# Patient Record
Sex: MALE | Race: WHITE | Employment: FULL TIME | URBAN - METROPOLITAN AREA
[De-identification: names, ages, dates, MRNs, and addresses within clinical notes are randomized per-mention and may not be internally consistent; named-entity substitution may affect disease eponyms.]

---

## 2019-11-18 RX ORDER — ATORVASTATIN CALCIUM 10 MG/1
10 TABLET, FILM COATED ORAL DAILY
COMMUNITY
End: 2021-08-07

## 2019-11-18 RX ORDER — GLUCOSAMINE SULFATE 1500 MG
1000 POWDER IN PACKET (EA) ORAL DAILY
COMMUNITY

## 2019-11-18 NOTE — PERIOP NOTES
[address] , [address] [facility]   MAIN OR                                  [phone]   MAIN PRE OP                          [phone]                                                                                AMBULATORY PRE OP          [phone]  PRE-ADMISSION TESTING    [phone]   Surgery Date:   Thursday 11/21/19        Is surgery arrival time given by surgeon? NO  If Danita Najera staff will call you Wednesday 11/20/19 between 3 and 7pm the day before your surgery with your arrival time. (If your surgery is on a Monday, we will call you the Friday before.)    Call [phone] after 7pm Monday-Friday if you did not receive this call. INSTRUCTIONS BEFORE YOUR SURGERY   When You  Arrive Arrive at the [address] [facility] on the day of your surgery  Have your insurance card, photo ID, and any copayment (if needed)   Food   and   Drink NO food or drink after midnight the night before surgery    This means NO water, gum, mints, coffee, juice, etc.  No alcohol (beer, wine, liquor) 24 hours before and after surgery   Medications to   TAKE   Morning of Surgery MEDICATIONS TO TAKE THE MORNING OF SURGERY WITH A SIP OF WATER:    none   Medications  To  STOP      7 days before surgery  Non-Steroidal anti-inflammatory Drugs (NSAID's): for example, Ibuprofen (Advil, Motrin), Naproxen (Aleve)   Aspirin, if taking for pain    Herbal supplements, vitamins, and fish oil     Blood  Thinners  If you take  Aspirin, Plavix, Coumadin, or any blood-thinning or anti-blood clot medicine, talk to the doctor who prescribed the medications for pre-operative instructions.    Bathing Clothing  Jewelry  Valuables      If you shower the morning of surgery, please do not apply anything to your skin (lotions, powders, deodorant)   Do not shave or trim anywhere 24 hours before surgery   Wear your hair loose or down; no pony-tails, buns, or metal hair clips   Wear loose, comfortable, clean clothes   Wear glasses instead of contacts  [facility], valuables, and jewelry, including body piercings, at home   Going Home - or Spending the Night  SAME-DAY SURGERY: You must have a responsible adult drive you home and stay with you 24 hours after surgery   ADMITS: If your doctor is keeping you in the hospital after surgery, leave personal belongings/luggage in your car until you have a hospital room number. Hospital discharge time is 12 noon  Drivers must be here before 12 noon unless you are told differently   Special Instructions Free  parking 7a-5p. Follow all instructions so your surgery wont be cancelled. Please, be on time. If a situation occurs and you are delayed the day of surgery, call [phone] or [phone]. If your physical condition changes (like a fever, cold, flu, etc.) call your surgeon. Home medication(s) reviewed and verified with patient during PAT appointment. The patient was contacted via phone. The patient verbalizes understanding of all instructions and does not  need reinforcement.

## 2019-11-20 ENCOUNTER — ANESTHESIA EVENT (OUTPATIENT)
Dept: SURGERY | Age: 49
End: 2019-11-20
Payer: COMMERCIAL

## 2019-11-21 ENCOUNTER — ANESTHESIA (OUTPATIENT)
Dept: SURGERY | Age: 49
End: 2019-11-21
Payer: COMMERCIAL

## 2019-11-21 ENCOUNTER — HOSPITAL ENCOUNTER (OUTPATIENT)
Age: 49
Setting detail: OUTPATIENT SURGERY
Discharge: HOME | End: 2019-11-21
Attending: PODIATRIST | Admitting: PODIATRIST
Payer: COMMERCIAL

## 2019-11-21 VITALS
DIASTOLIC BLOOD PRESSURE: 91 MMHG | BODY MASS INDEX: 32.4 KG/M2 | SYSTOLIC BLOOD PRESSURE: 129 MMHG | RESPIRATION RATE: 18 BRPM | WEIGHT: 260.58 LBS | OXYGEN SATURATION: 98 % | HEART RATE: 72 BPM | TEMPERATURE: 97.8 F | HEIGHT: 75 IN

## 2019-11-21 PROBLEM — M72.2 PLANTAR FASCIITIS OF LEFT FOOT: Status: ACTIVE | Noted: 2019-11-21

## 2019-11-21 PROCEDURE — 77030040361 HC SLV COMPR DVT MDII -B

## 2019-11-21 PROCEDURE — 76060000061 HC AMB SURG ANES 0.5 TO 1 HR: Performed by: PODIATRIST

## 2019-11-21 PROCEDURE — 77030002933 HC SUT MCRYL J&J -A: Performed by: PODIATRIST

## 2019-11-21 PROCEDURE — 74011250636 HC RX REV CODE- 250/636: Performed by: PODIATRIST

## 2019-11-21 PROCEDURE — 77030018836 HC SOL IRR NACL ICUM -A: Performed by: PODIATRIST

## 2019-11-21 PROCEDURE — 74011250636 HC RX REV CODE- 250/636: Performed by: ANESTHESIOLOGY

## 2019-11-21 PROCEDURE — 77030011640 HC PAD GRND REM COVD -A: Performed by: PODIATRIST

## 2019-11-21 PROCEDURE — 74011000250 HC RX REV CODE- 250: Performed by: PODIATRIST

## 2019-11-21 PROCEDURE — 76030000000 HC AMB SURG OR TIME 0.5 TO 1: Performed by: PODIATRIST

## 2019-11-21 PROCEDURE — 76210000046 HC AMBSU PH II REC FIRST 0.5 HR: Performed by: PODIATRIST

## 2019-11-21 PROCEDURE — 76210000034 HC AMBSU PH I REC 0.5 TO 1 HR: Performed by: PODIATRIST

## 2019-11-21 PROCEDURE — 77030037677: Performed by: PODIATRIST

## 2019-11-21 PROCEDURE — 77030000032 HC CUF TRNQT ZIMM -B: Performed by: PODIATRIST

## 2019-11-21 RX ORDER — ONDANSETRON 2 MG/ML
4 INJECTION INTRAMUSCULAR; INTRAVENOUS AS NEEDED
Status: DISCONTINUED | OUTPATIENT
Start: 2019-11-21 | End: 2019-11-21 | Stop reason: HOSPADM

## 2019-11-21 RX ORDER — SODIUM CHLORIDE, SODIUM LACTATE, POTASSIUM CHLORIDE, CALCIUM CHLORIDE 600; 310; 30; 20 MG/100ML; MG/100ML; MG/100ML; MG/100ML
100 INJECTION, SOLUTION INTRAVENOUS CONTINUOUS
Status: DISCONTINUED | OUTPATIENT
Start: 2019-11-21 | End: 2019-11-21 | Stop reason: HOSPADM

## 2019-11-21 RX ORDER — SODIUM CHLORIDE 0.9 % (FLUSH) 0.9 %
5-40 SYRINGE (ML) INJECTION AS NEEDED
Status: DISCONTINUED | OUTPATIENT
Start: 2019-11-21 | End: 2019-11-21 | Stop reason: HOSPADM

## 2019-11-21 RX ORDER — SODIUM CHLORIDE, SODIUM LACTATE, POTASSIUM CHLORIDE, CALCIUM CHLORIDE 600; 310; 30; 20 MG/100ML; MG/100ML; MG/100ML; MG/100ML
125 INJECTION, SOLUTION INTRAVENOUS CONTINUOUS
Status: DISCONTINUED | OUTPATIENT
Start: 2019-11-21 | End: 2019-11-21 | Stop reason: HOSPADM

## 2019-11-21 RX ORDER — SODIUM CHLORIDE, SODIUM LACTATE, POTASSIUM CHLORIDE, CALCIUM CHLORIDE 600; 310; 30; 20 MG/100ML; MG/100ML; MG/100ML; MG/100ML
75 INJECTION, SOLUTION INTRAVENOUS CONTINUOUS
Status: DISCONTINUED | OUTPATIENT
Start: 2019-11-21 | End: 2019-11-21 | Stop reason: HOSPADM

## 2019-11-21 RX ORDER — LIDOCAINE HYDROCHLORIDE 10 MG/ML
INJECTION INFILTRATION; PERINEURAL AS NEEDED
Status: DISCONTINUED | OUTPATIENT
Start: 2019-11-21 | End: 2019-11-21 | Stop reason: HOSPADM

## 2019-11-21 RX ORDER — FENTANYL CITRATE 50 UG/ML
INJECTION, SOLUTION INTRAMUSCULAR; INTRAVENOUS AS NEEDED
Status: DISCONTINUED | OUTPATIENT
Start: 2019-11-21 | End: 2019-11-21 | Stop reason: HOSPADM

## 2019-11-21 RX ORDER — MIDAZOLAM HYDROCHLORIDE 1 MG/ML
INJECTION, SOLUTION INTRAMUSCULAR; INTRAVENOUS AS NEEDED
Status: DISCONTINUED | OUTPATIENT
Start: 2019-11-21 | End: 2019-11-21 | Stop reason: HOSPADM

## 2019-11-21 RX ORDER — LIDOCAINE HYDROCHLORIDE 10 MG/ML
0.1 INJECTION, SOLUTION EPIDURAL; INFILTRATION; INTRACAUDAL; PERINEURAL AS NEEDED
Status: DISCONTINUED | OUTPATIENT
Start: 2019-11-21 | End: 2019-11-21 | Stop reason: HOSPADM

## 2019-11-21 RX ORDER — CEFAZOLIN SODIUM/WATER 2 G/20 ML
2 SYRINGE (ML) INTRAVENOUS ONCE
Status: DISCONTINUED | OUTPATIENT
Start: 2019-11-21 | End: 2019-11-21 | Stop reason: SDUPTHER

## 2019-11-21 RX ORDER — LIDOCAINE HYDROCHLORIDE 10 MG/ML
0.1 INJECTION, SOLUTION EPIDURAL; INFILTRATION; INTRACAUDAL; PERINEURAL AS NEEDED
Status: DISCONTINUED | OUTPATIENT
Start: 2019-11-21 | End: 2019-11-21 | Stop reason: SDUPTHER

## 2019-11-21 RX ORDER — SODIUM CHLORIDE 0.9 % (FLUSH) 0.9 %
5-40 SYRINGE (ML) INJECTION EVERY 8 HOURS
Status: DISCONTINUED | OUTPATIENT
Start: 2019-11-21 | End: 2019-11-21 | Stop reason: HOSPADM

## 2019-11-21 RX ORDER — DIPHENHYDRAMINE HYDROCHLORIDE 50 MG/ML
12.5 INJECTION, SOLUTION INTRAMUSCULAR; INTRAVENOUS AS NEEDED
Status: DISCONTINUED | OUTPATIENT
Start: 2019-11-21 | End: 2019-11-21 | Stop reason: HOSPADM

## 2019-11-21 RX ORDER — HYDROMORPHONE HYDROCHLORIDE 1 MG/ML
.25-1 INJECTION, SOLUTION INTRAMUSCULAR; INTRAVENOUS; SUBCUTANEOUS
Status: DISCONTINUED | OUTPATIENT
Start: 2019-11-21 | End: 2019-11-21 | Stop reason: HOSPADM

## 2019-11-21 RX ORDER — BUPIVACAINE HYDROCHLORIDE 5 MG/ML
INJECTION, SOLUTION EPIDURAL; INTRACAUDAL AS NEEDED
Status: DISCONTINUED | OUTPATIENT
Start: 2019-11-21 | End: 2019-11-21 | Stop reason: HOSPADM

## 2019-11-21 RX ORDER — PROPOFOL 10 MG/ML
INJECTION, EMULSION INTRAVENOUS
Status: DISCONTINUED | OUTPATIENT
Start: 2019-11-21 | End: 2019-11-21 | Stop reason: HOSPADM

## 2019-11-21 RX ADMIN — FENTANYL CITRATE 100 MCG: 50 INJECTION INTRAMUSCULAR; INTRAVENOUS at 09:45

## 2019-11-21 RX ADMIN — SODIUM CHLORIDE, POTASSIUM CHLORIDE, SODIUM LACTATE AND CALCIUM CHLORIDE: 600; 310; 30; 20 INJECTION, SOLUTION INTRAVENOUS at 09:36

## 2019-11-21 RX ADMIN — MIDAZOLAM 2 MG: 1 INJECTION INTRAMUSCULAR; INTRAVENOUS at 09:48

## 2019-11-21 RX ADMIN — MIDAZOLAM 2 MG: 1 INJECTION INTRAMUSCULAR; INTRAVENOUS at 09:32

## 2019-11-21 RX ADMIN — CEFAZOLIN 2 G: 1 INJECTION, POWDER, FOR SOLUTION INTRAMUSCULAR; INTRAVENOUS at 09:36

## 2019-11-21 RX ADMIN — PROPOFOL 50 MCG/KG/MIN: 10 INJECTION, EMULSION INTRAVENOUS at 09:40

## 2019-11-21 NOTE — ANESTHESIA PREPROCEDURE EVALUATION
Relevant Problems   No relevant active problems       Anesthetic History   No history of anesthetic complications            Review of Systems / Medical History  Patient summary reviewed and pertinent labs reviewed    Pulmonary  Within defined limits                 Neuro/Psych   Within defined limits           Cardiovascular              Hyperlipidemia         GI/Hepatic/Renal  Within defined limits              Endo/Other  Within defined limits           Other Findings              Physical Exam    Airway  Mallampati: II  TM Distance: 4 - 6 cm  Neck ROM: normal range of motion   Mouth opening: Normal     Cardiovascular  Regular rate and rhythm,  S1 and S2 normal,  no murmur, click, rub, or gallop  Rhythm: regular  Rate: normal         Dental  No notable dental hx       Pulmonary  Breath sounds clear to auscultation               Abdominal  GI exam deferred       Other Findings            Anesthetic Plan    ASA: 1  Anesthesia type: MAC          Induction: Intravenous  Anesthetic plan and risks discussed with: Patient

## 2019-11-21 NOTE — ANESTHESIA POSTPROCEDURE EVALUATION
Procedure(s):  PARTIAL RELEASE PLANTAR FASCIA LEFT FOOT. MAC    Anesthesia Post Evaluation        Patient location during evaluation: bedside  Patient participation: complete - patient cannot participate  Level of consciousness: awake  Pain management: satisfactory to patient  Airway patency: patent  Anesthetic complications: no  Cardiovascular status: acceptable  Respiratory status: acceptable  Hydration status: acceptable        Vitals Value Taken Time   /87 11/21/2019 10:27 AM   Temp 36.4 °C (97.5 °F) 11/21/2019 10:27 AM   Pulse 90 11/21/2019 10:29 AM   Resp 16 11/21/2019 10:29 AM   SpO2 92 % 11/21/2019 10:29 AM   Vitals shown include unvalidated device data.

## 2019-11-21 NOTE — BRIEF OP NOTE
BRIEF OPERATIVE NOTE    Date of Procedure: 11/21/2019   Preoperative Diagnosis: PLANTAR FASCIITIS  Postoperative Diagnosis: PLANTAR FASCIITIS  Procedure(s):  PARTIAL RELEASE PLANTAR FASCIA LEFT FOOT  Surgeon(s) and Role:     * Yolanda Gomez DPM - Primary         Surgical Assistant: none. Surgical Staff:  Circ-1: Jois Mccormick RN  Scrub Tech-1: Jaison LIZAMA  Surg Asst-1: Peri Mayo  Event Time In Time Out   Incision Start 0957    Incision Close 1024      Anesthesia: MAC   Estimated Blood Loss: Less than 5 mL. Specimens: * No specimens in log *   Findings: Hypertrophic scar tissue   Complications: None. Implants:   Implant Name Type Inv.  Item Serial No.  Lot No. LRB No. Used Action   [employer] ALLOGEN FZ LIQUID 1 ML   [id number] [employer] N/A Left 1 Implanted

## 2019-11-21 NOTE — DISCHARGE INSTRUCTIONS
Post-Operative Instructions:    Patient Name: Kimmy Rooney  Patient MRN: [id number]  : 1970  Discharged Date: 2019      MEDICATIONS:   -If you experience nausea, take anti-nausea medication   -Take pain medication regularly for first 48 hours. Then take as needed for pain.     -Often anti-nausea medication helps relieve pain due to it's mild sedative effect.   -Constipation can be a common side effect when taking narcotic pain medications, take precautions to avoid this:    -Drink plenty of fluids    -Eat plenty of fiber    -Avoid caffeine and dairy products    -Take stool softener if needed (Colace/Dulcolax)    DIET:   -Eat light foods for first meal today (ie: dry cereal/toast/crackers, clear fluids). -If tolerated first meal, then can eat normally at second meal.    ACTIVITY:   -Relative BED REST for first 72 hours (only getting to bathroom, bedroom, kitchen)   -Keep surgical shoe/boot on at all times (even when sleeping)   -Elevate surgical site at all times (at least 6 inches above hip level)   -Apply ice pack to just above surgical site (NOT directly on the site), 10 mins on and 20 mins off for the first 72 hours.   -Weight-bearing: NWB with crutches and surgical shoe, left foot. DRESSINGS/SHOWER:   -Keep dressing clean, dry, and intact at all times.   -Reinforce dressing as needed, but DO NOT remove dressing!! EMERGENCY:   -Call office ([phone]) or on all pager after hours ([phone]) if. ..    -bandages become wet or heavy drainage/bleeding noted    -medications are not helping your pain    -you injure or bump the surgical site    -you have fever over 101.5 degrees    FOLLOW-UP:   -Make sure you follow-up with your doctor within 1 week of surgery.    -Call office ([phone]) if you need to schedule appointment     ACTIVITY:  · Elevate feet were 48 hours  · Use crutches as directed by your doctor    DIET:  · Clear liquids until no nausea or vomiting; then light diet for the first day  · An advance to regular diet On second day, unless your doctor orders otherwise. PAIN:  · Take pain medication ouster acted by your doctor. · Call your doctor if pain is not relieved by medication. · Do not take aspirin or blood thinners until directed by your doctor. Patients signature:  Date: 11/21/2019  Discharging Nurse:    Physician: Fuentes Lemus, DPM        DISCHARGE SUMMARY from your Nurse      PATIENT INSTRUCTIONS    After general anesthesia or intravenous sedation, for 24 hours or while taking prescription Narcotics:  · Limit your activities  · Do not drive and operate hazardous machinery  · Do not make important personal or business decisions  · Do  not drink alcoholic beverages  · If you have not urinated within 8 hours after discharge, please contact your surgeon on call. Report the following to your surgeon:  · Excessive pain, swelling, redness or odor of or around the surgical area  · Temperature over 100.5  · Nausea and vomiting lasting longer than 4 hours or if unable to take medications  · Any signs of decreased circulation or nerve impairment to extremity: change in color, persistent  numbness, tingling, coldness or increase pain  · Any questions      COUGH AND DEEP BREATHE    Breathing deeply and coughing are very important exercises to do after surgery. Deep breathing and coughing open the little air tubes and air sacks in your lungs. You take deep breaths every day. You may not even notice - it is just something you do when you sigh or yawn. It is a natural exercise you do to keep these air passages open. After surgery, take deep breaths and cough, on purpose. DIRECTIONS:  · Take 10 to 15 slow deep breaths every hour while awake. · Breathe in deeply, and hold it for 2 seconds. · Exhale slowly through puckered lips, like blowing up a balloon. · After every 4th or 5th deep breath, hug your pillow to your chest or belly and give a hard, deep cough. Yes, it will probably hurt. But doing this exercise is a very important part of healing after surgery. Take your pain medicine to help you do this exercise without too much pain. Coughing and deep breathing help prevent bronchitis and pneumonia after surgery. If you had chest or belly surgery, use a pillow as a \"hug jorge\" and hold it tightly to your chest or belly when you cough. ANKLE PUMPS    Ankle pumps increase the circulation of oxygenated blood to your lower extremities and decrease your risk for circulation problems such as blood clots. They also stretch the muscles, tendons and ligaments in your foot and ankle, and prevent joint contracture in the ankle and foot, especially after surgeries on the legs. It is important to do ankle pump exercises regularly after surgery because immobility increases your risk for developing a blood clot. Your doctor may also have you take an Aspirin for the next few days as well. If your doctor did not ask you to take an Aspirin, consult with him before starting Aspirin therapy on your own. The exercise is quite simple. · Slowly point your foot forward, feeling the muscles on the top of your lower leg stretch, and hold this position for 5 seconds. · Next, pull your foot back toward you as far as possible, stretching the calf muscles, and hold that position for 5 seconds. · Repeat with the other foot. · Perform 10 repetitions every hour while awake for both ankles if possible (down and then up with the foot once is one repetition). You should feel gentle stretching of the muscles in your lower leg when doing this exercise. If you feel pain, or your range of motion is limited, don't push too hard. Only go the limit your joint and muscles will let you go. If you have increasing pain, progressively worsening leg warmth or swelling, STOP the exercise and call your doctor.            MEDICATION AND   SIDE EFFECT GUIDE    The [address] EFFECT GUIDE was provided to the PATIENT AND CARE PROVIDER. Information provided includes instruction about drug purpose and common side effects for the following medications:   · [address]   · PHENERGAN         These are general instructions for a healthy lifestyle:    *   Please give a list of your current medications to your Primary Care Provider. *   Please update this list whenever your medications are discontinued, doses are changed, or new medications (including over-the-counter products) are added. *   Please carry medication information at all times in case of emergency situations. About Smoking  No smoking / No tobacco products  Avoid exposure to second hand smoke     Surgeon General's Warning:  Quitting smoking now greatly reduces serious risk to your health. Obesity, smoking, and sedentary lifestyle greatly increases your risk for illness and disease. A healthy diet, regular physical exercise & weight monitoring are important for maintaining a healthy lifestyle. Congestive Heart Failure  You may be retaining fluid if you have a history of heart failure or if you experience any of the following symptoms:  Weight gain of 3 pounds or more overnight or 5 pounds in a week, increased swelling in your hands or feet or shortness of breath while lying flat in bed. Please call your doctor as soon as you notice any of these symptoms; do not wait until your next office visit. Recognize signs and symptoms of STROKE:  F -  Face looks uneven  A -  Arms unable to move or move evenly  S -  Speech slurred or non-existent  T -  Time-call 911 as soon as signs and symptoms begin-DO NOT go          back to bed or wait to see if you get better-TIME IS BRAIN. Warning Signs of HEART ATTACK   Call 911 if you have these symptoms:     Chest discomfort.  Most heart attacks involve discomfort in the center of the chest that lasts more than a few minutes, or that goes away and comes back. It can feel like uncomfortable pressure, squeezing, fullness, or pain.  Discomfort in other areas of the upper body. Symptoms can include pain or discomfort in one or both arms, the back, neck, jaw, or stomach.  Shortness of breath with or without chest discomfort.  Other signs may include breaking out in a cold sweat, nausea, or lightheadedness. Don't wait more than five minutes to call 911 - MINUTES MATTER! Fast action can save your life. Calling 911 is almost always the fastest way to get lifesaving treatment. Emergency Medical Services staff can begin treatment when they arrive -- up to an hour sooner than if someone gets to the hospital by car. The discharge information has been reviewed with the {PATIENT PARENT GUARDIAN:[id number]}. Any questions and concerns from the {PATIENT PARENT GUARDIAN:[id number]} have been addressed. The {PATIENT PARENT GUARDIAN:01683} verbalized understanding. Other information in your discharge envelope:  []     PRESCRIPTIONS  []     PHYSICAL THERAPY PRESCRIPTION  []     APPOINTMENT CARDS  []     Regional Anesthesia Pamphlet for block or block with On-Q Catheter from   Anesthesia Service  []     Medical device information sheets/pamphlets from their    []     School/work excuse note. []     /parent work excuse note. The following personal items collected during your admission are returned to you:   Dental Appliance: Dental Appliances: With patient, Other (comment)(permanent dental appliance in the mouth)  Vision: Visual Aid: None  Hearing Aid:    Jewelry: Jewelry: None  Clothing: Clothing: Footwear, Pants, Undergarments  Other Valuables:  Other Valuables: None  Valuables sent to safe: Personal Items Sent to Safe: none

## 2019-11-22 NOTE — OP NOTES
[facility]  OPERATIVE REPORT    Name:  Julianne Dey  MR#:  [id number]  :  1970  ACCOUNT #:  [de-identified]  DATE OF SERVICE:  2019    PREOPERATIVE DIAGNOSIS:  Plantar fasciitis of the left foot. POSTOPERATIVE DIAGNOSIS:  Plantar fasciitis of the left foot. PROCEDURE PERFORMED:  Partial release of the plantar fascia of the left foot. SURGEON:  Abbi aSl DPM    ASSISTANT:  None. ANESTHESIA:  Local with IV sedation. ANESTHESIOLOGIST:  Dr. Madisyn Garcia. COMPLICATIONS:  None. SPECIMENS REMOVED:  None. IMPLANTS:  1 cc of Amniotic derived injection by Vivex. ESTIMATED BLOOD LOSS:  Less than 5 mL. FINDINGS:  Hypertrophic scar tissue on the plantar fascia. INDICATIONS:  The patient is a 56-year-old white male, who presented to the office with chronically painful plantar fasciitis. The patient had undergone an extensive course of nonsurgical care in the form of multiple cortisone injections, home exercise plan, outpatient physical therapy, prefabricated and then custom foot orthotics, all with no symptomatic relief. The patient declined further nonsurgical care. The patient had all treatment options reviewed with him however from surgical to nonsurgical.  The patient was made aware of all risks, benefits, alternatives, and potential complications of surgical management including but not limited to need for additional surgery, failure of the procedure to achieve desired results, swelling, stiffness, scarring, numbness, nerve damage, infection of the soft tissue and/or bone, the potential of the surgery making his condition worse and not better. The patient had the consent reviewed, understood, and signed and elected for surgical management of his condition. PREPARATION AND PROCEDURE:  The patient was taken to the operating room and placed on the operating room table in supine position.   The patient had a well-padded pneumatic midcalf tourniquet applied to the left calf. The patient had received 2 g of Ancef for preoperative prophylactic antibiotics and had the left foot and ankle prepped and draped in normal sterile fashion. The pneumatic midcalf tourniquet was elevated to 300 mmHg and the procedure began. PROCEDURE #1:  Partial release of the plantar fascia of the left foot. Attention was directed towards the plantar medial arch of the left foot, where a small stab incision was made. Then, with the use of an 18-gauge needle, the needle was utilized to release the plantar medial and central bands of the plantar fascia. The site was copiously flushed and irrigated. Then, attention was directed along the entire course of the plantar central and plantar medial bands of the plantar fascia, where a palpable scar tissue could be identified. The, with the use of a 25-gauge needle, these hypertrophic scarred-in sites were fenestrated in a grid fashion. Then with the use of 20-mL syringe, 20 mL of .09% sterile saline was then were injected into the chronically scarred-in plantar medial and plantar central bands of the plantar fascia of the left foot. Next, the Vivex 1 mL amniotic-derived injectable was infiltrated throughout the plantar fascia to decrease postoperative swelling, scarring, and increase healing potential.  The site again was copiously flushed and irrigated. Preoperatively, it should be noted the patient received a tibial nerve block of 10 mL of 1% lidocaine and then 10 mL of 1% lidocaine was infiltrated into the surgical site more distally. The same injection was performed postoperatively utilizing a total of 20 mL of 0.5% Marcaine plain. The surgical site was dressed with Xeroform, 4x4, Porsha, and an Ace bandage. The pneumatic midcalf tourniquet had been deflated and note that the capillary filling time was immediate to all the digits of the left foot. The patient will be followed up as an outpatient.         [address] [place] [address], DPM      [email or web address]/[id number]/  D:  11/21/2019 13:10  T:  11/21/2019 22:42  JOB #:  [id number]

## 2021-08-07 ENCOUNTER — HOSPITAL ENCOUNTER (INPATIENT)
Age: 51
LOS: 2 days | Discharge: HOME | DRG: 177 | End: 2021-08-09
Attending: EMERGENCY MEDICINE | Admitting: INTERNAL MEDICINE
Payer: COMMERCIAL

## 2021-08-07 ENCOUNTER — APPOINTMENT (OUTPATIENT)
Dept: GENERAL RADIOLOGY | Age: 51
DRG: 177 | End: 2021-08-07
Attending: EMERGENCY MEDICINE
Payer: COMMERCIAL

## 2021-08-07 ENCOUNTER — APPOINTMENT (OUTPATIENT)
Dept: CT IMAGING | Age: 51
DRG: 177 | End: 2021-08-07
Attending: EMERGENCY MEDICINE
Payer: COMMERCIAL

## 2021-08-07 DIAGNOSIS — R09.02 HYPOXIA: ICD-10-CM

## 2021-08-07 DIAGNOSIS — A41.9 SEPSIS WITHOUT ACUTE ORGAN DYSFUNCTION, DUE TO UNSPECIFIED ORGANISM (HCC): ICD-10-CM

## 2021-08-07 DIAGNOSIS — U07.1 PNEUMONIA DUE TO COVID-19 VIRUS: Primary | ICD-10-CM

## 2021-08-07 DIAGNOSIS — J12.82 PNEUMONIA DUE TO COVID-19 VIRUS: Primary | ICD-10-CM

## 2021-08-07 PROBLEM — R79.89 ELEVATED LACTIC ACID LEVEL: Status: ACTIVE | Noted: 2021-08-07

## 2021-08-07 PROBLEM — R82.81 PYURIA: Status: ACTIVE | Noted: 2021-08-07

## 2021-08-07 PROBLEM — E78.5 HLD (HYPERLIPIDEMIA): Status: ACTIVE | Noted: 2021-08-07

## 2021-08-07 PROBLEM — R73.09 ELEVATED GLUCOSE: Status: ACTIVE | Noted: 2021-08-07

## 2021-08-07 PROBLEM — R65.20 SEPSIS WITH ACUTE ORGAN DYSFUNCTION (HCC): Status: ACTIVE | Noted: 2021-08-07

## 2021-08-07 PROBLEM — J96.01 ACUTE RESPIRATORY FAILURE WITH HYPOXIA (HCC): Status: ACTIVE | Noted: 2021-08-07

## 2021-08-07 LAB
ALBUMIN SERPL-MCNC: 3.4 G/DL (ref 3.5–5)
ALBUMIN/GLOB SERPL: 0.8 {RATIO} (ref 1.1–2.2)
ALP SERPL-CCNC: 65 U/L (ref 45–117)
ALT SERPL-CCNC: 28 U/L (ref 12–78)
ANION GAP SERPL CALC-SCNC: 7 MMOL/L (ref 5–15)
APPEARANCE UR: CLEAR
AST SERPL-CCNC: 26 U/L (ref 15–37)
BACTERIA URNS QL MICRO: ABNORMAL /HPF
BASOPHILS # BLD: 0 K/UL (ref 0–0.1)
BASOPHILS NFR BLD: 0 % (ref 0–1)
BILIRUB SERPL-MCNC: 0.7 MG/DL (ref 0.2–1)
BILIRUB UR QL: NEGATIVE
BUN SERPL-MCNC: 13 MG/DL (ref 6–20)
BUN/CREAT SERPL: 13 (ref 12–20)
CALCIUM SERPL-MCNC: 8.3 MG/DL (ref 8.5–10.1)
CHLORIDE SERPL-SCNC: 101 MMOL/L (ref 97–108)
CO2 SERPL-SCNC: 26 MMOL/L (ref 21–32)
COLOR UR: ABNORMAL
COMMENT, HOLDF: NORMAL
COVID-19 RAPID TEST, COVR: DETECTED
CREAT SERPL-MCNC: 1.04 MG/DL (ref 0.7–1.3)
D DIMER PPP FEU-MCNC: 1.21 MG/L FEU (ref 0–0.65)
DIFFERENTIAL METHOD BLD: ABNORMAL
EOSINOPHIL # BLD: 0 K/UL (ref 0–0.4)
EOSINOPHIL NFR BLD: 0 % (ref 0–7)
EPITH CASTS URNS QL MICRO: ABNORMAL /LPF
ERYTHROCYTE [DISTWIDTH] IN BLOOD BY AUTOMATED COUNT: 11.2 % (ref 11.5–14.5)
GLOBULIN SER CALC-MCNC: 4.3 G/DL (ref 2–4)
GLUCOSE BLD STRIP.AUTO-MCNC: 195 MG/DL (ref 65–117)
GLUCOSE BLD STRIP.AUTO-MCNC: 234 MG/DL (ref 65–117)
GLUCOSE SERPL-MCNC: 215 MG/DL (ref 65–100)
GLUCOSE UR STRIP.AUTO-MCNC: 250 MG/DL
HCT VFR BLD AUTO: 43.3 % (ref 36.6–50.3)
HGB BLD-MCNC: 15 G/DL (ref 12.1–17)
HGB UR QL STRIP: NEGATIVE
IMM GRANULOCYTES # BLD AUTO: 0.1 K/UL (ref 0–0.04)
IMM GRANULOCYTES NFR BLD AUTO: 1 % (ref 0–0.5)
KETONES UR QL STRIP.AUTO: ABNORMAL MG/DL
LACTATE SERPL-SCNC: 1.8 MMOL/L (ref 0.4–2)
LACTATE SERPL-SCNC: 2.2 MMOL/L (ref 0.4–2)
LEUKOCYTE ESTERASE UR QL STRIP.AUTO: NEGATIVE
LYMPHOCYTES # BLD: 0.6 K/UL (ref 0.8–3.5)
LYMPHOCYTES NFR BLD: 5 % (ref 12–49)
MCH RBC QN AUTO: 31.4 PG (ref 26–34)
MCHC RBC AUTO-ENTMCNC: 34.6 G/DL (ref 30–36.5)
MCV RBC AUTO: 90.6 FL (ref 80–99)
MONOCYTES # BLD: 0.2 K/UL (ref 0–1)
MONOCYTES NFR BLD: 2 % (ref 5–13)
NEUTS SEG # BLD: 10.8 K/UL (ref 1.8–8)
NEUTS SEG NFR BLD: 92 % (ref 32–75)
NITRITE UR QL STRIP.AUTO: NEGATIVE
NRBC # BLD: 0 K/UL (ref 0–0.01)
NRBC BLD-RTO: 0 PER 100 WBC
PH UR STRIP: 6.5 [PH] (ref 5–8)
PLATELET # BLD AUTO: 150 K/UL (ref 150–400)
PMV BLD AUTO: 8.8 FL (ref 8.9–12.9)
POTASSIUM SERPL-SCNC: 3.3 MMOL/L (ref 3.5–5.1)
PROCALCITONIN SERPL-MCNC: 0.16 NG/ML
PROT SERPL-MCNC: 7.7 G/DL (ref 6.4–8.2)
PROT UR STRIP-MCNC: 100 MG/DL
RBC # BLD AUTO: 4.78 M/UL (ref 4.1–5.7)
RBC #/AREA URNS HPF: ABNORMAL /HPF (ref 0–5)
RBC MORPH BLD: ABNORMAL
SAMPLES BEING HELD,HOLD: NORMAL
SARS-COV-2, COV2: NORMAL
SERVICE CMNT-IMP: ABNORMAL
SERVICE CMNT-IMP: ABNORMAL
SODIUM SERPL-SCNC: 134 MMOL/L (ref 136–145)
SOURCE, COVRS: ABNORMAL
SP GR UR REFRACTOMETRY: 1.02 (ref 1–1.03)
TROPONIN I SERPL-MCNC: <0.05 NG/ML
UA: UC IF INDICATED,UAUC: ABNORMAL
UROBILINOGEN UR QL STRIP.AUTO: 4 EU/DL (ref 0.2–1)
WBC # BLD AUTO: 11.7 K/UL (ref 4.1–11.1)
WBC URNS QL MICRO: ABNORMAL /HPF (ref 0–4)

## 2021-08-07 PROCEDURE — 71045 X-RAY EXAM CHEST 1 VIEW: CPT

## 2021-08-07 PROCEDURE — 74011636637 HC RX REV CODE- 636/637: Performed by: INTERNAL MEDICINE

## 2021-08-07 PROCEDURE — 87635 SARS-COV-2 COVID-19 AMP PRB: CPT

## 2021-08-07 PROCEDURE — 87040 BLOOD CULTURE FOR BACTERIA: CPT

## 2021-08-07 PROCEDURE — 74011000258 HC RX REV CODE- 258: Performed by: INTERNAL MEDICINE

## 2021-08-07 PROCEDURE — 74011250637 HC RX REV CODE- 250/637: Performed by: INTERNAL MEDICINE

## 2021-08-07 PROCEDURE — 99285 EMERGENCY DEPT VISIT HI MDM: CPT

## 2021-08-07 PROCEDURE — 74011000250 HC RX REV CODE- 250: Performed by: INTERNAL MEDICINE

## 2021-08-07 PROCEDURE — 87899 AGENT NOS ASSAY W/OPTIC: CPT

## 2021-08-07 PROCEDURE — 83605 ASSAY OF LACTIC ACID: CPT

## 2021-08-07 PROCEDURE — 87449 NOS EACH ORGANISM AG IA: CPT

## 2021-08-07 PROCEDURE — 94640 AIRWAY INHALATION TREATMENT: CPT

## 2021-08-07 PROCEDURE — 74011000636 HC RX REV CODE- 636: Performed by: STUDENT IN AN ORGANIZED HEALTH CARE EDUCATION/TRAINING PROGRAM

## 2021-08-07 PROCEDURE — 65660000000 HC RM CCU STEPDOWN

## 2021-08-07 PROCEDURE — 71275 CT ANGIOGRAPHY CHEST: CPT

## 2021-08-07 PROCEDURE — 36415 COLL VENOUS BLD VENIPUNCTURE: CPT

## 2021-08-07 PROCEDURE — 87086 URINE CULTURE/COLONY COUNT: CPT

## 2021-08-07 PROCEDURE — 96374 THER/PROPH/DIAG INJ IV PUSH: CPT

## 2021-08-07 PROCEDURE — 85379 FIBRIN DEGRADATION QUANT: CPT

## 2021-08-07 PROCEDURE — 81001 URINALYSIS AUTO W/SCOPE: CPT

## 2021-08-07 PROCEDURE — 93005 ELECTROCARDIOGRAM TRACING: CPT

## 2021-08-07 PROCEDURE — 80053 COMPREHEN METABOLIC PANEL: CPT

## 2021-08-07 PROCEDURE — 74011250637 HC RX REV CODE- 250/637: Performed by: EMERGENCY MEDICINE

## 2021-08-07 PROCEDURE — 84145 PROCALCITONIN (PCT): CPT

## 2021-08-07 PROCEDURE — 74011250636 HC RX REV CODE- 250/636: Performed by: INTERNAL MEDICINE

## 2021-08-07 PROCEDURE — 74011250636 HC RX REV CODE- 250/636: Performed by: EMERGENCY MEDICINE

## 2021-08-07 PROCEDURE — 84484 ASSAY OF TROPONIN QUANT: CPT

## 2021-08-07 PROCEDURE — 85025 COMPLETE CBC W/AUTO DIFF WBC: CPT

## 2021-08-07 PROCEDURE — XW033E5 INTRODUCTION OF REMDESIVIR ANTI-INFECTIVE INTO PERIPHERAL VEIN, PERCUTANEOUS APPROACH, NEW TECHNOLOGY GROUP 5: ICD-10-PCS | Performed by: INTERNAL MEDICINE

## 2021-08-07 PROCEDURE — 82962 GLUCOSE BLOOD TEST: CPT

## 2021-08-07 RX ORDER — SODIUM CHLORIDE 0.9 % (FLUSH) 0.9 %
5-40 SYRINGE (ML) INJECTION AS NEEDED
Status: DISCONTINUED | OUTPATIENT
Start: 2021-08-07 | End: 2021-08-09 | Stop reason: HOSPADM

## 2021-08-07 RX ORDER — PSEUDOEPHEDRINE HCL 30 MG
30 TABLET ORAL
COMMUNITY

## 2021-08-07 RX ORDER — SODIUM CHLORIDE 0.9 % (FLUSH) 0.9 %
5-10 SYRINGE (ML) INJECTION AS NEEDED
Status: DISCONTINUED | OUTPATIENT
Start: 2021-08-07 | End: 2021-08-09 | Stop reason: HOSPADM

## 2021-08-07 RX ORDER — ZINC GLUCONATE 50 MG
50 TABLET ORAL DAILY
Status: DISCONTINUED | OUTPATIENT
Start: 2021-08-08 | End: 2021-08-09 | Stop reason: HOSPADM

## 2021-08-07 RX ORDER — MELATONIN
2000 DAILY
Status: DISCONTINUED | OUTPATIENT
Start: 2021-08-08 | End: 2021-08-09 | Stop reason: HOSPADM

## 2021-08-07 RX ORDER — POTASSIUM CHLORIDE 750 MG/1
40 TABLET, FILM COATED, EXTENDED RELEASE ORAL
Status: COMPLETED | OUTPATIENT
Start: 2021-08-07 | End: 2021-08-07

## 2021-08-07 RX ORDER — DEXAMETHASONE SODIUM PHOSPHATE 10 MG/ML
6 INJECTION INTRAMUSCULAR; INTRAVENOUS DAILY
Status: DISCONTINUED | OUTPATIENT
Start: 2021-08-07 | End: 2021-08-07

## 2021-08-07 RX ORDER — ONDANSETRON 4 MG/1
4 TABLET, ORALLY DISINTEGRATING ORAL
Status: DISCONTINUED | OUTPATIENT
Start: 2021-08-07 | End: 2021-08-09 | Stop reason: HOSPADM

## 2021-08-07 RX ORDER — ENOXAPARIN SODIUM 100 MG/ML
30 INJECTION SUBCUTANEOUS EVERY 12 HOURS
Status: DISCONTINUED | OUTPATIENT
Start: 2021-08-07 | End: 2021-08-07 | Stop reason: DRUGHIGH

## 2021-08-07 RX ORDER — MAGNESIUM SULFATE 100 %
4 CRYSTALS MISCELLANEOUS AS NEEDED
Status: DISCONTINUED | OUTPATIENT
Start: 2021-08-07 | End: 2021-08-09 | Stop reason: HOSPADM

## 2021-08-07 RX ORDER — ACETAMINOPHEN 325 MG/1
650 TABLET ORAL
Status: DISCONTINUED | OUTPATIENT
Start: 2021-08-07 | End: 2021-08-09 | Stop reason: HOSPADM

## 2021-08-07 RX ORDER — ACETAMINOPHEN 500 MG
1000 TABLET ORAL
Status: COMPLETED | OUTPATIENT
Start: 2021-08-07 | End: 2021-08-07

## 2021-08-07 RX ORDER — DEXAMETHASONE SODIUM PHOSPHATE 10 MG/ML
6 INJECTION INTRAMUSCULAR; INTRAVENOUS
Status: COMPLETED | OUTPATIENT
Start: 2021-08-07 | End: 2021-08-07

## 2021-08-07 RX ORDER — ASCORBIC ACID 500 MG
500 TABLET ORAL 2 TIMES DAILY
Status: DISCONTINUED | OUTPATIENT
Start: 2021-08-07 | End: 2021-08-09 | Stop reason: HOSPADM

## 2021-08-07 RX ORDER — GUAIFENESIN 1200 MG/1
1200 TABLET, EXTENDED RELEASE ORAL 2 TIMES DAILY
COMMUNITY

## 2021-08-07 RX ORDER — DEXAMETHASONE SODIUM PHOSPHATE 100 MG/10ML
6 INJECTION INTRAMUSCULAR; INTRAVENOUS
Status: DISCONTINUED | OUTPATIENT
Start: 2021-08-07 | End: 2021-08-07

## 2021-08-07 RX ORDER — ACETAMINOPHEN 325 MG/1
650 TABLET ORAL
COMMUNITY

## 2021-08-07 RX ORDER — ACETAMINOPHEN 650 MG/1
650 SUPPOSITORY RECTAL
Status: DISCONTINUED | OUTPATIENT
Start: 2021-08-07 | End: 2021-08-09 | Stop reason: HOSPADM

## 2021-08-07 RX ORDER — ENOXAPARIN SODIUM 100 MG/ML
40 INJECTION SUBCUTANEOUS EVERY 12 HOURS
Status: DISCONTINUED | OUTPATIENT
Start: 2021-08-07 | End: 2021-08-09 | Stop reason: HOSPADM

## 2021-08-07 RX ORDER — DEXAMETHASONE SODIUM PHOSPHATE 10 MG/ML
6 INJECTION INTRAMUSCULAR; INTRAVENOUS DAILY
Status: DISCONTINUED | OUTPATIENT
Start: 2021-08-08 | End: 2021-08-09 | Stop reason: HOSPADM

## 2021-08-07 RX ORDER — DEXTROSE 50 % IN WATER (D50W) INTRAVENOUS SYRINGE
12.5-25 AS NEEDED
Status: DISCONTINUED | OUTPATIENT
Start: 2021-08-07 | End: 2021-08-09 | Stop reason: HOSPADM

## 2021-08-07 RX ORDER — POLYETHYLENE GLYCOL 3350 17 G/17G
17 POWDER, FOR SOLUTION ORAL DAILY PRN
Status: DISCONTINUED | OUTPATIENT
Start: 2021-08-07 | End: 2021-08-09 | Stop reason: HOSPADM

## 2021-08-07 RX ORDER — GUAIFENESIN 600 MG/1
1200 TABLET, EXTENDED RELEASE ORAL EVERY 12 HOURS
Status: DISCONTINUED | OUTPATIENT
Start: 2021-08-07 | End: 2021-08-09 | Stop reason: HOSPADM

## 2021-08-07 RX ORDER — SODIUM CHLORIDE 0.9 % (FLUSH) 0.9 %
5-40 SYRINGE (ML) INJECTION EVERY 8 HOURS
Status: DISCONTINUED | OUTPATIENT
Start: 2021-08-07 | End: 2021-08-09 | Stop reason: HOSPADM

## 2021-08-07 RX ORDER — ONDANSETRON 2 MG/ML
4 INJECTION INTRAMUSCULAR; INTRAVENOUS
Status: DISCONTINUED | OUTPATIENT
Start: 2021-08-07 | End: 2021-08-09 | Stop reason: HOSPADM

## 2021-08-07 RX ORDER — DEXAMETHASONE SODIUM PHOSPHATE 10 MG/ML
6 INJECTION INTRAMUSCULAR; INTRAVENOUS DAILY
Status: DISCONTINUED | OUTPATIENT
Start: 2021-08-08 | End: 2021-08-07

## 2021-08-07 RX ORDER — INSULIN LISPRO 100 [IU]/ML
INJECTION, SOLUTION INTRAVENOUS; SUBCUTANEOUS
Status: DISCONTINUED | OUTPATIENT
Start: 2021-08-07 | End: 2021-08-09 | Stop reason: HOSPADM

## 2021-08-07 RX ADMIN — OXYCODONE HYDROCHLORIDE AND ACETAMINOPHEN 500 MG: 500 TABLET ORAL at 17:30

## 2021-08-07 RX ADMIN — Medication 10 ML: at 20:57

## 2021-08-07 RX ADMIN — CEFTRIAXONE 1 G: 1 INJECTION, POWDER, FOR SOLUTION INTRAMUSCULAR; INTRAVENOUS at 16:09

## 2021-08-07 RX ADMIN — IOPAMIDOL 100 ML: 755 INJECTION, SOLUTION INTRAVENOUS at 14:34

## 2021-08-07 RX ADMIN — INSULIN LISPRO 3 UNITS: 100 INJECTION, SOLUTION INTRAVENOUS; SUBCUTANEOUS at 17:31

## 2021-08-07 RX ADMIN — ENOXAPARIN SODIUM 40 MG: 30 INJECTION SUBCUTANEOUS at 16:12

## 2021-08-07 RX ADMIN — REMDESIVIR 200 MG: 100 INJECTION, POWDER, LYOPHILIZED, FOR SOLUTION INTRAVENOUS at 17:09

## 2021-08-07 RX ADMIN — ACETAMINOPHEN 650 MG: 325 TABLET ORAL at 17:11

## 2021-08-07 RX ADMIN — Medication 10 ML: at 17:30

## 2021-08-07 RX ADMIN — ACETAMINOPHEN 1000 MG: 500 TABLET ORAL at 13:04

## 2021-08-07 RX ADMIN — GUAIFENESIN 1200 MG: 600 TABLET ORAL at 16:08

## 2021-08-07 RX ADMIN — DEXAMETHASONE SODIUM PHOSPHATE 6 MG: 10 INJECTION, SOLUTION INTRAMUSCULAR; INTRAVENOUS at 13:04

## 2021-08-07 RX ADMIN — IPRATROPIUM BROMIDE AND ALBUTEROL 1 PUFF: 20; 100 SPRAY, METERED RESPIRATORY (INHALATION) at 19:45

## 2021-08-07 RX ADMIN — AZITHROMYCIN MONOHYDRATE 500 MG: 500 INJECTION, POWDER, LYOPHILIZED, FOR SOLUTION INTRAVENOUS at 15:08

## 2021-08-07 RX ADMIN — POTASSIUM CHLORIDE 40 MEQ: 750 TABLET, FILM COATED, EXTENDED RELEASE ORAL at 16:08

## 2021-08-07 NOTE — ED TRIAGE NOTES
Pt reports he started not feeling well 3 days ago with cough, fever, malaise, and headaches. Pt states his son was around people who tested positive for covid. Has been vaccinated.

## 2021-08-07 NOTE — PROGRESS NOTES
CM attempted to complete initial assessment, however, CM unable to reach Pt on ER phone and Pt's cell phone. CM also reached out to Pt's spouse, Neeta Woodard, with no answer. CM will continue to follow.     _____________________________________  DIA MembrenoW - Care Management  8/7/2021   3:57 PM

## 2021-08-07 NOTE — PROGRESS NOTES
Enoxaparin 30 mg SQ Q12H adjusted to 40 mg SQ Q12H (COVID+, BMI > 30, CrCl > 30 mL/min, D-dimer 1.21) per protocol    Thank you,  Alexis LIZAMA [facility]

## 2021-08-07 NOTE — ED NOTES
TRANSFER - OUT REPORT:    Verbal report given to Claribel Tim RN (name) on Devi Sewell  being transferred to [facility] (unit) for routine progression of care       Report consisted of patients Situation, Background, Assessment and   Recommendations(SBAR). Information from the following report(s) SBAR, Kardex, ED Summary, [facility] and Recent Results was reviewed with the receiving nurse. Lines:   Peripheral IV 08/07/21 Left Antecubital (Active)       Peripheral IV 08/07/21 Left Hand (Active)        Opportunity for questions and clarification was provided.       Patient transported with:   Monitor  Tech

## 2021-08-07 NOTE — PROGRESS NOTES
[facility]: MED RECONCILIATION    Comments/Recommendations:   Prior to admission medication list updated per telephone conversation with patient in ER room 7. Preferred pharmacy is [facility] on [address]. Patient does not take any prescription medications. Patient has been taking acetaminophen, Mucinex, and Sudafed for past few days. Patient had initially been taking ibuprofen, but switched to acetaminophen due to stomach upset with ibuprofen. Allergies: Patient has no known allergies. Prior to Admission Medications   Prescriptions Last Dose Informant Patient Reported? Taking?   acetaminophen (TYLENOL) 325 mg tablet 8/7/2021 at Unknown time Self Yes Yes   Sig: Take 650 mg by mouth every four (4) hours as needed for Fever. cholecalciferol (VITAMIN D3) 1,000 unit cap 8/4/2021 Self Yes Yes   Sig: Take 1,000 Units by mouth daily. guaiFENesin (Mucinex) 1,200 mg Ta12 ER tablet 8/7/2021 at Unknown time Self Yes Yes   Sig: Take 1,200 mg by mouth two (2) times a day. pseudoephedrine (Sudafed) 30 mg tablet 8/7/2021 at Unknown time Self Yes Yes   Sig: Take 30 mg by mouth every four (4) hours as needed for Congestion.         Jodee El, Pharmacist

## 2021-08-07 NOTE — ED PROVIDER NOTES
59-year-old male with history of hyperlipidemia presents with cough, fever, malaise, headaches for the past 3 days. He states that his son was around another individual at his [employer] group that had Covid. His son was sick for a day. Patient has been vaccinated. He received the [employer] vaccine in May. He has had fevers for the past 3 days up to 104. He was taking ibuprofen but it was irritating his stomach. He is now taking Tylenol. His last dose was this morning at 8 AM.      Shortness of Breath         Past Medical History:   Diagnosis Date    High cholesterol        Past Surgical History:   Procedure Laterality Date    HX ORTHOPAEDIC Right 2017    meniscus repair    HX ORTHOPAEDIC Left 2017    shoulder surgery    HX ORTHOPAEDIC Right 2004    shoulder surgery    HX TONSILLECTOMY      HX WISDOM TEETH EXTRACTION           No family history on file. Social History     Socioeconomic History    Marital status:      Spouse name: Not on file    Number of children: Not on file    Years of education: Not on file    Highest education level: Not on file   Occupational History    Not on file   Tobacco Use    Smoking status: Never Smoker    Smokeless tobacco: Never Used   Substance and Sexual Activity    Alcohol use: Yes     Comment: social    Drug use: Never    Sexual activity: Not on file   Other Topics Concern    Not on file   Social History Narrative    Not on file     Social Determinants of Health     Financial Resource Strain:     Difficulty of Paying Living Expenses:    Food Insecurity:     Worried About Running Out of Food in the Last Year:     [address] in the Last Year:    Transportation Needs:     Lack of Transportation (Medical):      Lack of Transportation (Non-Medical):    Physical Activity:     Days of Exercise per Week:     Minutes of Exercise per Session:    Stress:     Feeling of Stress :    Social Connections:     Frequency of Communication with Friends and Family:     Frequency of Social Gatherings with Friends and Family:     Attends [employer] Services:     Active Member of Clubs or Organizations:     Attends Club or Organization Meetings:     Marital Status:    Intimate Partner Violence:     Fear of Current or Ex-Partner:     Emotionally Abused:     Physically Abused:     Sexually Abused: ALLERGIES: Patient has no known allergies. Review of Systems   Respiratory: Positive for shortness of breath. All other systems reviewed and are negative. Vitals:    08/07/21 1149   BP: (!) 143/85   Pulse: (!) 122   Resp: 24   Temp: (!) 102 °F (38.9 °C)   SpO2: 94%   Weight: 118.2 kg (260 lb 9.3 oz)   Height: 6' 3\" (1.905 m)            Physical Exam  Vitals and nursing note reviewed. Constitutional:       General: He is not in acute distress. Appearance: He is obese. HENT:      Head: Normocephalic and atraumatic. Eyes:      General: No scleral icterus. Conjunctiva/sclera: Conjunctivae normal.      Pupils: Pupils are equal, round, and reactive to light. Neck:      Trachea: No tracheal deviation. Cardiovascular:      Rate and Rhythm: Regular rhythm. Tachycardia present. Pulmonary:      Effort: Pulmonary effort is normal. No respiratory distress. Breath sounds: No stridor. Rales (Faint throughout) present. Abdominal:      General: There is no distension. Palpations: Abdomen is soft. Tenderness: There is no abdominal tenderness. Genitourinary:     Comments: deferred  Musculoskeletal:         General: No deformity. Cervical back: No rigidity. Right lower leg: No edema. Left lower leg: No edema. Skin:     General: Skin is warm and dry. Neurological:      General: No focal deficit present. Mental Status: He is alert.    Psychiatric:         Mood and Affect: Mood normal.         Behavior: Behavior normal.          MDM  Number of Diagnoses or Management Options  Diagnosis management comments: 61-year-old male with history of hyperlipidemia, obesity presents with cough, fever, shortness of breath, headache with a potential Covid exposure. I attempted to ambulate him at the bedside in place. He was only able to walk in place for 30 seconds and had to quit due to dyspnea. His heart rate elevated to 140. His oxygen saturation dropped to 89%. ED Course as of Aug 07 1410   Sat Aug 07, 2021   1241 EKG shows sinus rhythm at rate of 113, normal intervals, normal axis, normal ST segments T waves    [TT]   1336 Lactate 2.2    [EP]      ED Course User Index  [EP] GENNARO Dunlap  [TT] Alvaro See MD       Procedures      Perfect Serve Consult for Admission  2:25 PM    ED Room Number: ER07/07  Patient Name and age:  Ernesto Small 53 y.o.  male  Working Diagnosis:   1. Pneumonia due to COVID-19 virus    2. Sepsis without acute organ dysfunction, due to unspecified organism ([facility] [address]    3. Hypoxia        COVID-19 Suspicion:  yes  Sepsis present:  yes  Reassessment needed: yes  Code Status:  Full Code  Readmission: no  Isolation Requirements:  yes  Recommended Level of Care:  telemetry  Department:[facility] - [phone]  Other: Becomes hypoxic and very dyspneic with ambulation. Given Decadron, azithromycin. Rapid Covid positive. Total critical care time spent exclusive of procedures:  33 minutes    Cedric Gallegos MD

## 2021-08-07 NOTE — PROGRESS NOTES
Bedside and Verbal shift change report given to Eudelia Goodell, RN (oncoming nurse) by Juan Leroy RN (offgoing nurse). Report included the following information SBAR, Kardex, ED Summary, Intake/Output, Recent Results, Med Rec Status and Cardiac Rhythm NSR. Bedside and Verbal shift change report given to Татьяна Ochoa RN (oncoming nurse) by Neo Betancourt (offgoing nurse). Report included the following information SBAR, Kardex, ED Summary, Intake/Output, Recent Results, Med Rec Status and Cardiac Rhythm NSR.

## 2021-08-07 NOTE — H&P
[facility] [place] [address]  [address]  [phone]    Admission History and Physical      NAME:  Bassam Hebert   :   1970   MRN:  [id number]     PCP:  Marina Palacio MD     Date/Time of service:  2021  3:21 PM        Subjective:     CHIEF COMPLAINT: Fevers    HISTORY OF PRESENT ILLNESS:     Mr. Casey Houser is a 53 y.o.  male with a past medical history of hyperlipidemia who is admitted with Covid. Mr. Casey Houser states that he began to to experience dyspnea, cough fevers and diarrhea on Wednesday. His symptoms have worsened thus he presented to the emergency room today. He states that he received his JJ vaccine in May 2021. He states that his son recently felt unwell, and he suspects he might of had Covid but he was never tested. Today, Mr. Kip Babinski testing positive for Covid and is hypoxic on ambulation. No Known Allergies    Prior to Admission medications    Medication Sig Start Date End Date Taking? Authorizing Provider   atorvastatin (LIPITOR) 10 mg tablet Take 10 mg by mouth daily. Provider, Historical   cholecalciferol (VITAMIN D3) 1,000 unit cap Take 1,000 Units by mouth daily. Provider, Historical       Past Medical History:   Diagnosis Date    High cholesterol         Past Surgical History:   Procedure Laterality Date    HX ORTHOPAEDIC Right     meniscus repair    HX ORTHOPAEDIC Left 2017    shoulder surgery    HX ORTHOPAEDIC Right     shoulder surgery    HX TONSILLECTOMY      HX WISDOM TEETH EXTRACTION         Social History     Tobacco Use    Smoking status: Never Smoker    Smokeless tobacco: Never Used   Substance Use Topics    Alcohol use: Yes     Comment: social        No family history on file.      Review of Systems:  (bold if positive, if negative)    Gen:  fever, chills, fatigue  Eyes:  ENT:  CVS:  Pulm:  Cough, dyspneaGI:  :  MS:  Skin:  Psych:  Endo:  Hem:  Renal:  Neuro:            Objective:      VITALS: Vital signs reviewed; most recent are:    Visit Vitals  BP (!) 143/85 (BP 1 Location: Right upper arm, BP Patient Position: Sitting)   Pulse (!) 122   Temp (!) 102 °F (38.9 °C)   Resp 24   Ht 6' 3\" (1.905 m)   Wt 118.2 kg (260 lb 9.3 oz)   SpO2 94%   BMI 32.57 kg/m²     SpO2 Readings from Last 6 Encounters:   08/07/21 94%   11/21/19 98%        No intake or output data in the 24 hours ending 08/07/21 1521     Exam:     Physical Exam:    Gen:  Well-developed, well-nourished, in no acute distress  HEENT:  Pink conjunctivae, PERRL, hearing intact to voice  Resp:  No accessory muscle use, clear breath sounds without wheezes rales or rhonchi  Card:  RRR, No murmurs, normal S1, S2, no peripheral edema  Abd:  Soft, non-tender, non-distended, normoactive bowel sounds are present  Lymph:  No cervical adenopathy  Musc:  No cyanosis or clubbing  Skin:  No rashes or ulcers, skin turgor is good  Neuro:  Cranial nerves 3-12 are grossly intact,  follows commands appropriately  Psych:  Oriented to person, place, and time, Alert with good insight      Labs:    Recent Labs     08/07/21  1251   WBC 11.7*   HGB 15.0   HCT 43.3        Recent Labs     08/07/21  1251   *   K 3.3*      CO2 26   *   BUN 13   CREA 1.04   CA 8.3*   ALB 3.4*   TBILI 0.7   ALT 28     No results found for: GLUCPOC  No results for input(s): PH, PCO2, PO2, HCO3, FIO2 in the last 72 hours. No results for input(s): INR, INREXT in the last 72 hours. Radiology and EKG reviewed: CTA chest with no evidence of PE but notes multifocal pneumonia. Old Records reviewed in [address] [place] [address]       Assessment/Plan:        Acute respiratory failure with hypoxia ([facility] [address] (8/7/2021): Desaturates to 86% on ambulation. CTA chest negative for PE. Nasal cannula as needed. ABG and chest x-ray as needed. Incentive spirometry. Scheduled nebs. Consult pulmonology. Pneumonia due to COVID-19 virus (8/7/2021): Follow blood and pneumonia serologies.   IV Decadron, ceftriaxone and azithromycin. Multivitamins. Sepsis with acute organ dysfunction (HCC) (8/7/2021)/ Elevated lactic acid level (8/7/2021): Meets sepsis criteria based on tachycardia, fevers, tachypnea with evidence of pneumonia. Elevated lactate to 2.2; trend until normal.  IV antibiotics and steroids as above. Pyuria (8/7/2021): Follow urine culture. IV ceftriaxone. Elevated glucose (8/7/2021): Check A1c. Will likely be worse with steroids. Insulin sliding scale. HLD (hyperlipidemia) (8/7/2021): Does not appear on home statin.          Risk of deterioration: high      Total time spent with patient: 54 Minutes **I personally saw and examined the patient during this time period**                 Care Plan discussed with: Patient and Nursing Staff    Discussed:  Code Status and Care Plan    Prophylaxis:  Lovenox    Probable Disposition:  Home w/Family           ___________________________________________________    Attending Physician: Chente Avery DO

## 2021-08-08 LAB
ALBUMIN SERPL-MCNC: 3.1 G/DL (ref 3.5–5)
ALBUMIN/GLOB SERPL: 0.7 {RATIO} (ref 1.1–2.2)
ALP SERPL-CCNC: 63 U/L (ref 45–117)
ALT SERPL-CCNC: 24 U/L (ref 12–78)
ANION GAP SERPL CALC-SCNC: 7 MMOL/L (ref 5–15)
AST SERPL-CCNC: 19 U/L (ref 15–37)
BACTERIA SPEC CULT: NORMAL
BASOPHILS # BLD: 0 K/UL (ref 0–0.1)
BASOPHILS NFR BLD: 0 % (ref 0–1)
BILIRUB SERPL-MCNC: 0.5 MG/DL (ref 0.2–1)
BUN SERPL-MCNC: 15 MG/DL (ref 6–20)
BUN/CREAT SERPL: 18 (ref 12–20)
CALCIUM SERPL-MCNC: 8.2 MG/DL (ref 8.5–10.1)
CHLORIDE SERPL-SCNC: 107 MMOL/L (ref 97–108)
CO2 SERPL-SCNC: 24 MMOL/L (ref 21–32)
CREAT SERPL-MCNC: 0.85 MG/DL (ref 0.7–1.3)
CRP SERPL-MCNC: 23.9 MG/DL (ref 0–0.6)
D DIMER PPP FEU-MCNC: 1.1 MG/L FEU (ref 0–0.65)
DIFFERENTIAL METHOD BLD: ABNORMAL
EOSINOPHIL # BLD: 0 K/UL (ref 0–0.4)
EOSINOPHIL NFR BLD: 0 % (ref 0–7)
ERYTHROCYTE [DISTWIDTH] IN BLOOD BY AUTOMATED COUNT: 11.4 % (ref 11.5–14.5)
FERRITIN SERPL-MCNC: 870 NG/ML (ref 26–388)
FIBRINOGEN PPP-MCNC: 783 MG/DL (ref 200–475)
GLOBULIN SER CALC-MCNC: 4.4 G/DL (ref 2–4)
GLUCOSE BLD STRIP.AUTO-MCNC: 145 MG/DL (ref 65–117)
GLUCOSE BLD STRIP.AUTO-MCNC: 150 MG/DL (ref 65–117)
GLUCOSE BLD STRIP.AUTO-MCNC: 249 MG/DL (ref 65–117)
GLUCOSE BLD STRIP.AUTO-MCNC: 263 MG/DL (ref 65–117)
GLUCOSE SERPL-MCNC: 142 MG/DL (ref 65–100)
HCT VFR BLD AUTO: 40.3 % (ref 36.6–50.3)
HGB BLD-MCNC: 13.8 G/DL (ref 12.1–17)
IMM GRANULOCYTES # BLD AUTO: 0.1 K/UL (ref 0–0.04)
IMM GRANULOCYTES NFR BLD AUTO: 1 % (ref 0–0.5)
LYMPHOCYTES # BLD: 0.9 K/UL (ref 0.8–3.5)
LYMPHOCYTES NFR BLD: 6 % (ref 12–49)
MAGNESIUM SERPL-MCNC: 2.2 MG/DL (ref 1.6–2.4)
MCH RBC QN AUTO: 30.8 PG (ref 26–34)
MCHC RBC AUTO-ENTMCNC: 34.2 G/DL (ref 30–36.5)
MCV RBC AUTO: 90 FL (ref 80–99)
MONOCYTES # BLD: 0.4 K/UL (ref 0–1)
MONOCYTES NFR BLD: 3 % (ref 5–13)
NEUTS SEG # BLD: 12.8 K/UL (ref 1.8–8)
NEUTS SEG NFR BLD: 90 % (ref 32–75)
NRBC # BLD: 0 K/UL (ref 0–0.01)
NRBC BLD-RTO: 0 PER 100 WBC
PLATELET # BLD AUTO: 189 K/UL (ref 150–400)
PMV BLD AUTO: 9.1 FL (ref 8.9–12.9)
POTASSIUM SERPL-SCNC: 3.8 MMOL/L (ref 3.5–5.1)
PROT SERPL-MCNC: 7.5 G/DL (ref 6.4–8.2)
RBC # BLD AUTO: 4.48 M/UL (ref 4.1–5.7)
SERVICE CMNT-IMP: ABNORMAL
SERVICE CMNT-IMP: NORMAL
SODIUM SERPL-SCNC: 138 MMOL/L (ref 136–145)
WBC # BLD AUTO: 14.2 K/UL (ref 4.1–11.1)

## 2021-08-08 PROCEDURE — 77010033678 HC OXYGEN DAILY

## 2021-08-08 PROCEDURE — 74011250637 HC RX REV CODE- 250/637: Performed by: INTERNAL MEDICINE

## 2021-08-08 PROCEDURE — 85384 FIBRINOGEN ACTIVITY: CPT

## 2021-08-08 PROCEDURE — 83735 ASSAY OF MAGNESIUM: CPT

## 2021-08-08 PROCEDURE — 94640 AIRWAY INHALATION TREATMENT: CPT

## 2021-08-08 PROCEDURE — 82728 ASSAY OF FERRITIN: CPT

## 2021-08-08 PROCEDURE — 94664 DEMO&/EVAL PT USE INHALER: CPT

## 2021-08-08 PROCEDURE — 82962 GLUCOSE BLOOD TEST: CPT

## 2021-08-08 PROCEDURE — 94760 N-INVAS EAR/PLS OXIMETRY 1: CPT

## 2021-08-08 PROCEDURE — 86140 C-REACTIVE PROTEIN: CPT

## 2021-08-08 PROCEDURE — 74011000258 HC RX REV CODE- 258: Performed by: INTERNAL MEDICINE

## 2021-08-08 PROCEDURE — 85379 FIBRIN DEGRADATION QUANT: CPT

## 2021-08-08 PROCEDURE — 36415 COLL VENOUS BLD VENIPUNCTURE: CPT

## 2021-08-08 PROCEDURE — 80053 COMPREHEN METABOLIC PANEL: CPT

## 2021-08-08 PROCEDURE — 74011636637 HC RX REV CODE- 636/637: Performed by: INTERNAL MEDICINE

## 2021-08-08 PROCEDURE — 85025 COMPLETE CBC W/AUTO DIFF WBC: CPT

## 2021-08-08 PROCEDURE — 65660000000 HC RM CCU STEPDOWN

## 2021-08-08 PROCEDURE — 74011000250 HC RX REV CODE- 250: Performed by: INTERNAL MEDICINE

## 2021-08-08 PROCEDURE — 74011250636 HC RX REV CODE- 250/636: Performed by: INTERNAL MEDICINE

## 2021-08-08 RX ADMIN — GUAIFENESIN 1200 MG: 600 TABLET ORAL at 20:16

## 2021-08-08 RX ADMIN — IPRATROPIUM BROMIDE AND ALBUTEROL 1 PUFF: 20; 100 SPRAY, METERED RESPIRATORY (INHALATION) at 05:17

## 2021-08-08 RX ADMIN — ACETAMINOPHEN 650 MG: 325 TABLET ORAL at 13:14

## 2021-08-08 RX ADMIN — INSULIN LISPRO 2 UNITS: 100 INJECTION, SOLUTION INTRAVENOUS; SUBCUTANEOUS at 10:09

## 2021-08-08 RX ADMIN — DEXAMETHASONE SODIUM PHOSPHATE 6 MG: 10 INJECTION, SOLUTION INTRAMUSCULAR; INTRAVENOUS at 10:10

## 2021-08-08 RX ADMIN — GUAIFENESIN 1200 MG: 600 TABLET ORAL at 10:10

## 2021-08-08 RX ADMIN — Medication 10 ML: at 05:18

## 2021-08-08 RX ADMIN — ACETAMINOPHEN 650 MG: 325 TABLET ORAL at 20:16

## 2021-08-08 RX ADMIN — OXYCODONE HYDROCHLORIDE AND ACETAMINOPHEN 500 MG: 500 TABLET ORAL at 10:10

## 2021-08-08 RX ADMIN — Medication 10 ML: at 20:17

## 2021-08-08 RX ADMIN — REMDESIVIR 100 MG: 100 INJECTION, POWDER, LYOPHILIZED, FOR SOLUTION INTRAVENOUS at 16:59

## 2021-08-08 RX ADMIN — ENOXAPARIN SODIUM 40 MG: 30 INJECTION SUBCUTANEOUS at 05:17

## 2021-08-08 RX ADMIN — IPRATROPIUM BROMIDE AND ALBUTEROL 1 PUFF: 20; 100 SPRAY, METERED RESPIRATORY (INHALATION) at 20:18

## 2021-08-08 RX ADMIN — Medication 10 ML: at 13:13

## 2021-08-08 RX ADMIN — Medication 50 MG: at 10:10

## 2021-08-08 RX ADMIN — INSULIN LISPRO 3 UNITS: 100 INJECTION, SOLUTION INTRAVENOUS; SUBCUTANEOUS at 16:58

## 2021-08-08 RX ADMIN — IPRATROPIUM BROMIDE AND ALBUTEROL 1 PUFF: 20; 100 SPRAY, METERED RESPIRATORY (INHALATION) at 13:53

## 2021-08-08 RX ADMIN — Medication 2000 UNITS: at 10:10

## 2021-08-08 RX ADMIN — INSULIN LISPRO 3 UNITS: 100 INJECTION, SOLUTION INTRAVENOUS; SUBCUTANEOUS at 22:19

## 2021-08-08 RX ADMIN — INSULIN LISPRO 2 UNITS: 100 INJECTION, SOLUTION INTRAVENOUS; SUBCUTANEOUS at 13:11

## 2021-08-08 RX ADMIN — OXYCODONE HYDROCHLORIDE AND ACETAMINOPHEN 500 MG: 500 TABLET ORAL at 16:59

## 2021-08-08 RX ADMIN — ENOXAPARIN SODIUM 40 MG: 30 INJECTION SUBCUTANEOUS at 18:26

## 2021-08-08 NOTE — ACP (ADVANCE CARE PLANNING)
8/8/2021  9:29 AM    Advance Care Planning     Advance Care Planning Activator (Inpatient)  Conversation Note      Date of ACP Conversation: 08/08/21     Conversation Conducted with:   Patient, Xander Mayo    ACP Activator: Monique Lozada Decision Maker:    Current Designated Health Care Decision Maker:      Victor M Johnson, spouse, [phone]    Care Preferences    Ventilation: \"If you were in your present state of health and suddenly became very ill and were unable to breathe on your own, what would your preference be about the use of a ventilator (breathing machine) if it were available to you? \"      If patient would desire the use of a ventilator (breathing machine), answer \"yes\", if not \"no\":yes    \"If your health worsens and it becomes clear that your chance of recovery is unlikely, what would your preference be about the use of a ventilator (breathing machine) if it were available to you? \"     Would the patient desire the use of a ventilator (breathing machine)? YES      Resuscitation  \"CPR works best to restart the heart when there is a sudden event, like a heart attack, in someone who is otherwise healthy. Unfortunately, CPR does not typically restart the heart for people who have serious health conditions or who are very sick. \"    \"In the event your heart stopped as a result of an underlying serious health condition, would you want attempts to be made to restart your heart (answer \"yes\" for attempt to resuscitate) or would you prefer a natural death (answer \"no\" for do not attempt to resuscitate)? \" yes      [] Yes  [] No   Educated Patient / Moose Esquivel regarding differences between Advance Directives and portable DNR orders.     Length of ACP Conversation in minutes:  5 min    Conversation Outcomes:  [x] ACP discussion completed  [] Existing advance directive reviewed with patient; no changes to patient's previously recorded wishes     [] New Advance Directive completed   [] Portable Do Not Resuscitate prepared for Provider review and signature  [] POLST/POST/MOLST/MOST prepared for Provider review and signature      Follow-up plan:    [] Schedule follow-up conversation to continue planning  [] Referred individual to Provider for additional questions/concerns   [] Advised patient/agent/surrogate to review completed ACP document and update if needed with changes in condition, patient preferences or care setting     [] This note routed to one or more involved healthcare providers    Love Acevedo RN

## 2021-08-08 NOTE — PROGRESS NOTES
Daily Progress Note: 8/8/2021  Ashley Santo MD    Assessment/Plan:   Acute respiratory failure with hypoxia [facility]) (8/7/2021): Desaturates to 86% on ambulation. -CTA chest negative for PE.   -Nasal cannula as needed. -ABG and chest x-ray as needed. -Incentive spirometry    -Consult pulmonology.     Pneumonia due to COVID-19 virus (8/7/2021):   -Follow blood and pneumonia serologies. -IV Decadron, ceftriaxone and azithromycin. -Multivitamins.     Sepsis with acute organ dysfunction (HCC) (8/7/2021)/ Elevated lactic acid level (8/7/2021): Meets sepsis criteria based on tachycardia, fevers, tachypnea with evidence of pneumonia.    -Elevated lactate to 2.2; trend until normal.    -IV antibiotics and steroids as above.     Pyuria (8/7/2021):   -Follow urine culture. -IV ceftriaxone.     Elevated glucose (8/7/2021):   -Check A1c.   Will likely be worse with steroids.    -Insulin sliding scale.     HLD (hyperlipidemia) (8/7/2021): Does not appear on home statin.        Problem List:  Problem List as of 8/8/2021 Date Reviewed: 11/21/2019        Codes Class Noted - Resolved    Acute respiratory failure with hypoxia [facility]) ICD-10-CM: J96.01  ICD-9-CM: 518.81  8/7/2021 - Present        Pneumonia due to COVID-19 virus ICD-10-CM: U07.1, J12.82  ICD-9-CM: 480.8, 079.89  8/7/2021 - Present        Sepsis with acute organ dysfunction ([facility] [address] ICD-10-CM: A41.9, R65.20  ICD-9-CM: 038.9, 995.92  8/7/2021 - Present        Elevated glucose ICD-10-CM: R73.09  ICD-9-CM: 790.29  8/7/2021 - Present        Elevated lactic acid level ICD-10-CM: R79.89  ICD-9-CM: 276.2  8/7/2021 - Present        Pyuria ICD-10-CM: R82.81  ICD-9-CM: 791.9  8/7/2021 - Present        HLD (hyperlipidemia) ICD-10-CM: E78.5  ICD-9-CM: 272.4  8/7/2021 - Present        Plantar fasciitis of left foot ICD-10-CM: M72.2  ICD-9-CM: 728.71  11/21/2019 - Present        RESOLVED: COVID-19 ICD-10-CM: U07.1  ICD-9-CM: 079.89  8/7/2021 - 8/7/2021 Subjective:    53 y.o.  male with a past medical history of hyperlipidemia who is admitted with Covid. Mr. Dimitri Lackey states that he began to to experience dyspnea, cough fevers and diarrhea on Wednesday. His symptoms have worsened thus he presented to the emergency room today. He states that he received his JJ vaccine in May 2021. He states that his son recently felt unwell, and he suspects he might of had Covid but he was never tested. Today, Mr. Zeenat Reddyd testing positive for Covid and is hypoxic on ambulation. (Dr Leda Roldan)    :  Feeling a little better. Tmax 102 on admission. On 2L NC and sats are in the 90s. Coughing up sputum.     Review of Systems:   A comprehensive review of systems was negative except for that written in the HPI. Objective:   Physical Exam:     Visit Vitals  BP (!) 114/57 (BP 1 Location: Left upper arm, BP Patient Position: At rest)   Pulse 77   Temp 98.4 °F (36.9 °C)   Resp 15   Ht 6' 3\" (1.905 m)   Wt 104.3 kg (230 lb)   SpO2 93%   BMI 28.75 kg/m²    O2 Flow Rate (L/min): 2 l/min O2 Device: Nasal cannula    Temp (24hrs), Av.9 °F (37.7 °C), Min:98.4 °F (36.9 °C), Max:102 °F (38.9 °C)    1901 - 700  In: -   Out: 1500 [Urine:1500]   701 - 1900  In: 250 [I.V.:250]  Out: -     General:  Alert, cooperative, no distress, appears stated age. Head:  Normocephalic, without obvious abnormality, atraumatic. Eyes:  Conjunctivae/corneas clear. PERRL, EOMs intact. Nose: Nares normal. Septum midline. Mucosa normal. No drainage or sinus tenderness. Throat: Lips, mucosa, and tongue moist..   Neck: Supple, symmetrical, trachea midline, no adenopathy, thyroid: no enlargement/tenderness/nodules, no carotid bruit and no JVD. Back:   Symmetric, no curvature. ROM normal. No CVA tenderness. Lungs:   Scattered rhonchi throughout   Chest wall:  No tenderness or deformity. Heart:  Regular rate and rhythm, S1, S2 normal, no murmur, click, rub or gallop. Abdomen:   Soft, non-tender. Bowel sounds normal. No masses,  No organomegaly. Extremities: no cyanosis or edema. No calf tenderness or cords. Pulses: 2+ and symmetric all extremities. Skin: Skin color, texture, turgor normal. No rashes or lesions   Neurologic: CNII-XII intact. Alert and oriented X 3. Fine motor of hands and fingers normal.   equal.  No cogwheeling or rigidity. Gait not tested at this time. Sensation grossly normal to touch. Gross motor of extremities normal.       Data Review:   CT Chest 8/7/21    FINDINGS:   There is no pulmonary embolism. There is no aortic aneurysm or dissection.     Scattered peripheral groundglass and parenchymal opacities. Hepatic steatosis. There is no pleural or pericardial effusion. There is no mediastinal, axillary  or hilar lymphadenopathy. The aorta is normal in course and caliber. The  proximal pulmonary arteries are without evidence of filling defects. No lytic or  blastic lesions are identified. The remainder of the upper abdomen visualized is  unremarkable.     IMPRESSION  There is no pulmonary embolism. There is no aortic aneurysm or dissection. Imaging findings consistent with multifocal viral pneumonia. Consider Covid. Recent Days:  Recent Labs     08/08/21  0528 08/07/21  1251   WBC 14.2* 11.7*   HGB 13.8 15.0   HCT 40.3 43.3    150     Recent Labs     08/07/21  1251   *   K 3.3*      CO2 26   *   BUN 13   CREA 1.04   CA 8.3*   ALB 3.4*   TBILI 0.7   ALT 28     No results for input(s): PH, PCO2, PO2, HCO3, FIO2 in the last 72 hours.     24 Hour Results:  Recent Results (from the past 24 hour(s))   CULTURE, BLOOD    Collection Time: 08/07/21 12:51 PM    Specimen: Blood   Result Value Ref Range    Special Requests: NO SPECIAL REQUESTS      Culture result: NO GROWTH AFTER 16 HOURS     METABOLIC PANEL, COMPREHENSIVE    Collection Time: 08/07/21 12:51 PM   Result Value Ref Range    Sodium 134 (L) 136 - 145 mmol/L Potassium 3.3 (L) 3.5 - 5.1 mmol/L    Chloride 101 97 - 108 mmol/L    CO2 26 21 - 32 mmol/L    Anion gap 7 5 - 15 mmol/L    Glucose 215 (H) 65 - 100 mg/dL    BUN 13 6 - 20 MG/DL    Creatinine 1.04 0.70 - 1.30 MG/DL    BUN/Creatinine ratio 13 12 - 20      GFR est AA >60 >60 ml/min/1.73m2    GFR est non-AA >60 >60 ml/min/1.73m2    Calcium 8.3 (L) 8.5 - 10.1 MG/DL    Bilirubin, total 0.7 0.2 - 1.0 MG/DL    ALT (SGPT) 28 12 - 78 U/L    AST (SGOT) 26 15 - 37 U/L    Alk. phosphatase 65 45 - 117 U/L    Protein, total 7.7 6.4 - 8.2 g/dL    Albumin 3.4 (L) 3.5 - 5.0 g/dL    Globulin 4.3 (H) 2.0 - 4.0 g/dL    A-G Ratio 0.8 (L) 1.1 - 2.2     CBC WITH AUTOMATED DIFF    Collection Time: 08/07/21 12:51 PM   Result Value Ref Range    WBC 11.7 (H) 4.1 - 11.1 K/uL    RBC 4.78 4.10 - 5.70 M/uL    HGB 15.0 12.1 - 17.0 g/dL    HCT 43.3 36.6 - 50.3 %    MCV 90.6 80.0 - 99.0 FL    MCH 31.4 26.0 - 34.0 PG    MCHC 34.6 30.0 - 36.5 g/dL    RDW 11.2 (L) 11.5 - 14.5 %    PLATELET 630 602 - 443 K/uL    MPV 8.8 (L) 8.9 - 12.9 FL    NRBC 0.0 0  WBC    ABSOLUTE NRBC 0.00 0.00 - 0.01 K/uL    NEUTROPHILS 92 (H) 32 - 75 %    LYMPHOCYTES 5 (L) 12 - 49 %    MONOCYTES 2 (L) 5 - 13 %    EOSINOPHILS 0 0 - 7 %    BASOPHILS 0 0 - 1 %    IMMATURE GRANULOCYTES 1 (H) 0.0 - 0.5 %    ABS. NEUTROPHILS 10.8 (H) 1.8 - 8.0 K/UL    ABS. LYMPHOCYTES 0.6 (L) 0.8 - 3.5 K/UL    ABS. MONOCYTES 0.2 0.0 - 1.0 K/UL    ABS. EOSINOPHILS 0.0 0.0 - 0.4 K/UL    ABS. BASOPHILS 0.0 0.0 - 0.1 K/UL    ABS. IMM.  GRANS. 0.1 (H) 0.00 - 0.04 K/UL    DF SMEAR SCANNED      RBC COMMENTS NORMOCYTIC, NORMOCHROMIC     TROPONIN I    Collection Time: 08/07/21 12:51 PM   Result Value Ref Range    Troponin-I, Qt. <0.05 <0.05 ng/mL   D DIMER    Collection Time: 08/07/21 12:51 PM   Result Value Ref Range    D-dimer 1.21 (H) 0.00 - 0.65 mg/L FEU   PROCALCITONIN    Collection Time: 08/07/21 12:51 PM   Result Value Ref Range    Procalcitonin 0.16 ng/mL   LACTIC ACID    Collection Time: 08/07/21 12:51 PM   Result Value Ref Range    Lactic acid 2.2 (HH) 0.4 - 2.0 MMOL/L   SAMPLES BEING HELD    Collection Time: 08/07/21 12:51 PM   Result Value Ref Range    SAMPLES BEING HELD 1SST     COMMENT        Add-on orders for these samples will be processed based on acceptable specimen integrity and analyte stability, which may vary by analyte.    CULTURE, BLOOD    Collection Time: 08/07/21  1:15 PM    Specimen: Blood   Result Value Ref Range    Special Requests: NO SPECIAL REQUESTS      Culture result: NO GROWTH AFTER 2 HOURS     URINALYSIS W/ REFLEX CULTURE    Collection Time: 08/07/21  1:35 PM    Specimen: Urine   Result Value Ref Range    Color YELLOW/STRAW      Appearance CLEAR CLEAR      Specific gravity 1.025 1.003 - 1.030      pH (UA) 6.5 5.0 - 8.0      Protein 100 (A) NEG mg/dL    Glucose 250 (A) NEG mg/dL    Ketone TRACE (A) NEG mg/dL    Bilirubin Negative NEG      Blood Negative NEG      Urobilinogen 4.0 (H) 0.2 - 1.0 EU/dL    Nitrites Negative NEG      Leukocyte Esterase Negative NEG      WBC 20-50 0 - 4 /hpf    RBC 5-10 0 - 5 /hpf    Epithelial cells FEW FEW /lpf    Bacteria 1+ (A) NEG /hpf    UA:UC IF INDICATED URINE CULTURE ORDERED (A) CNI     SARS-COV-2    Collection Time: 08/07/21  1:59 PM   Result Value Ref Range    SARS-CoV-2 Please find results under separate order     COVID-19 RAPID TEST    Collection Time: 08/07/21  1:59 PM   Result Value Ref Range    Specimen source Nasopharyngeal      COVID-19 rapid test Detected (AA) NOTD     GLUCOSE, POC    Collection Time: 08/07/21  4:44 PM   Result Value Ref Range    Glucose (POC) 234 (H) 65 - 117 mg/dL    Performed by Alida Rayo (PCT)    LACTIC ACID    Collection Time: 08/07/21  5:13 PM   Result Value Ref Range    Lactic acid 1.8 0.4 - 2.0 MMOL/L   GLUCOSE, POC    Collection Time: 08/07/21  8:25 PM   Result Value Ref Range    Glucose (POC) 195 (H) 65 - 117 mg/dL    Performed by Chevy Hylton (PCT)    CBC WITH AUTOMATED DIFF    Collection Time: 08/08/21  5:28 AM   Result Value Ref Range    WBC 14.2 (H) 4.1 - 11.1 K/uL    RBC 4.48 4.10 - 5.70 M/uL    HGB 13.8 12.1 - 17.0 g/dL    HCT 40.3 36.6 - 50.3 %    MCV 90.0 80.0 - 99.0 FL    MCH 30.8 26.0 - 34.0 PG    MCHC 34.2 30.0 - 36.5 g/dL    RDW 11.4 (L) 11.5 - 14.5 %    PLATELET 228 460 - 088 K/uL    MPV 9.1 8.9 - 12.9 FL    NRBC 0.0 0  WBC    ABSOLUTE NRBC 0.00 0.00 - 0.01 K/uL    NEUTROPHILS 90 (H) 32 - 75 %    LYMPHOCYTES 6 (L) 12 - 49 %    MONOCYTES 3 (L) 5 - 13 %    EOSINOPHILS 0 0 - 7 %    BASOPHILS 0 0 - 1 %    IMMATURE GRANULOCYTES 1 (H) 0.0 - 0.5 %    ABS. NEUTROPHILS 12.8 (H) 1.8 - 8.0 K/UL    ABS. LYMPHOCYTES 0.9 0.8 - 3.5 K/UL    ABS. MONOCYTES 0.4 0.0 - 1.0 K/UL    ABS. EOSINOPHILS 0.0 0.0 - 0.4 K/UL    ABS. BASOPHILS 0.0 0.0 - 0.1 K/UL    ABS. IMM.  GRANS. 0.1 (H) 0.00 - 0.04 K/UL    DF AUTOMATED         Medications reviewed  Current Facility-Administered Medications   Medication Dose Route Frequency    sodium chloride (NS) flush 5-10 mL  5-10 mL IntraVENous PRN    sodium chloride (NS) flush 5-40 mL  5-40 mL IntraVENous Q8H    sodium chloride (NS) flush 5-40 mL  5-40 mL IntraVENous PRN    acetaminophen (TYLENOL) tablet 650 mg  650 mg Oral Q6H PRN    Or    acetaminophen (TYLENOL) suppository 650 mg  650 mg Rectal Q6H PRN    polyethylene glycol (MIRALAX) packet 17 g  17 g Oral DAILY PRN    ondansetron (ZOFRAN ODT) tablet 4 mg  4 mg Oral Q8H PRN    Or    ondansetron (ZOFRAN) injection 4 mg  4 mg IntraVENous Q6H PRN    cholecalciferol (VITAMIN D3) (1000 Units /25 mcg) tablet 2,000 Units  2,000 Units Oral DAILY    ascorbic acid (vitamin C) (VITAMIN C) tablet 500 mg  500 mg Oral BID    zinc gluconate tablet 50 mg  50 mg Oral DAILY    guaiFENesin ER (MUCINEX) tablet 1,200 mg  1,200 mg Oral Q12H    cefTRIAXone (ROCEPHIN) 1 g in sterile water (preservative free) 10 mL IV syringe  1 g IntraVENous DAILY    azithromycin (ZITHROMAX) 500 mg in 0.9% sodium chloride 250 mL (VIAL-MATE)  500 mg IntraVENous Q24H    insulin lispro (HUMALOG) injection   SubCUTAneous AC&HS    glucose chewable tablet 16 g  4 Tablet Oral PRN    dextrose (D50W) injection syrg 12.5-25 g  12.5-25 g IntraVENous PRN    glucagon (GLUCAGEN) injection 1 mg  1 mg IntraMUSCular PRN    enoxaparin (LOVENOX) injection 40 mg  40 mg SubCUTAneous Q12H    remdesivir 100 mg in 0.9% sodium chloride 250 mL IVPB  100 mg IntraVENous Q24H    ipratropium-albuterol (COMBIVENT RESPIMAT) 20 mcg-100 mcg inhalation spray  1 Puff Inhalation Q6H RT    dexamethasone (PF) (DECADRON) 10 mg/mL injection 6 mg  6 mg IntraVENous DAILY       Care Plan discussed with: Patient/Family and Nurse    Total time spent with patient: 30 minutes.     Sudheer Cullen MD

## 2021-08-08 NOTE — CONSULTS
t    Name: [facility]: [address]   : 1970 Admit Date: 2021   Phone: [phone]  Room: 327/01   PCP: Jovany Milton MD  MRN: [id number]   Date: 2021  Code: Full Code        HPI:      Chart and notes reviewed. Data reviewed. I review the patient's current medications in the medical record at each encounter.  I have evaluated and examined the patient. 9:18 AM       History was obtained from patient. I was asked by Jose De Jesus Castillo DO to see Chet Arnold in consultation for a chief complaint of Covid-19 pneumonia. History of Present Illness:   is a very pleasant, 52 yo gentleman that presented to [facility] yesterday after starting to feel poorly on Wednesday. His son was diagnosed with Covid-19 last week. He received the J&J vaccine in May. Reports that he had several days of shortness of breath, fevers, and cough. Denies past history of lung disease. He is a never smoker. Other PMH is hyperlipidemia. He reports that he is feeling better this morning. Sitting up in chair. Images:  Personally reviewed. CXR : Bibasilar opacities, minimal infiltrate in lateral aspect of left midlung    CTA : No PE. Multifocal peripheral groundglass opacities. WBC 14.2  D-dimer 1.1  Creat . 85  CRP 23.9      Past Medical History:   Diagnosis Date    High cholesterol        Past Surgical History:   Procedure Laterality Date    HX ORTHOPAEDIC Right     meniscus repair    HX ORTHOPAEDIC Left 2017    shoulder surgery    HX ORTHOPAEDIC Right 2004    shoulder surgery    HX TONSILLECTOMY      HX WISDOM TEETH EXTRACTION         No family history on file.     Social History     Tobacco Use    Smoking status: Never Smoker    Smokeless tobacco: Never Used   Substance Use Topics    Alcohol use: Yes     Comment: social       No Known Allergies    Current Facility-Administered Medications   Medication Dose Route Frequency    sodium chloride (NS) flush 5-10 mL  5-10 mL IntraVENous PRN    sodium chloride (NS) flush 5-40 mL  5-40 mL IntraVENous Q8H    sodium chloride (NS) flush 5-40 mL  5-40 mL IntraVENous PRN    acetaminophen (TYLENOL) tablet 650 mg  650 mg Oral Q6H PRN    Or    acetaminophen (TYLENOL) suppository 650 mg  650 mg Rectal Q6H PRN    polyethylene glycol (MIRALAX) packet 17 g  17 g Oral DAILY PRN    ondansetron (ZOFRAN ODT) tablet 4 mg  4 mg Oral Q8H PRN    Or    ondansetron (ZOFRAN) injection 4 mg  4 mg IntraVENous Q6H PRN    cholecalciferol (VITAMIN D3) (1000 Units /25 mcg) tablet 2,000 Units  2,000 Units Oral DAILY    ascorbic acid (vitamin C) (VITAMIN C) tablet 500 mg  500 mg Oral BID    zinc gluconate tablet 50 mg  50 mg Oral DAILY    guaiFENesin ER (MUCINEX) tablet 1,200 mg  1,200 mg Oral Q12H    cefTRIAXone (ROCEPHIN) 1 g in sterile water (preservative free) 10 mL IV syringe  1 g IntraVENous DAILY    azithromycin (ZITHROMAX) 500 mg in 0.9% sodium chloride 250 mL (VIAL-MATE)  500 mg IntraVENous Q24H    insulin lispro (HUMALOG) injection   SubCUTAneous AC&HS    glucose chewable tablet 16 g  4 Tablet Oral PRN    dextrose (D50W) injection syrg 12.5-25 g  12.5-25 g IntraVENous PRN    glucagon (GLUCAGEN) injection 1 mg  1 mg IntraMUSCular PRN    enoxaparin (LOVENOX) injection 40 mg  40 mg SubCUTAneous Q12H    remdesivir 100 mg in 0.9% sodium chloride 250 mL IVPB  100 mg IntraVENous Q24H    ipratropium-albuterol (COMBIVENT RESPIMAT) 20 mcg-100 mcg inhalation spray  1 Puff Inhalation Q6H RT    dexamethasone (PF) (DECADRON) 10 mg/mL injection 6 mg  6 mg IntraVENous DAILY         REVIEW OF SYSTEMS   Negative except as stated in the HPI. Physical Exam:   Visit Vitals  /86 (BP 1 Location: Right upper arm, BP Patient Position: At rest)   Pulse 100   Temp 98.4 °F (36.9 °C)   Resp 16   Ht 6' 3\" (1.905 m)   Wt 104.3 kg (230 lb)   SpO2 94%   BMI 28.75 kg/m²       General:  Alert, cooperative, no distress, appears stated age.    Head: Normocephalic, without obvious abnormality, atraumatic. Eyes:  Conjunctivae/corneas clear. Nose: Nares normal. Septum midline. Mucosa normal.    Throat: Lips, mucosa, and tongue normal. Teeth and gums normal.   Neck: Supple, symmetrical, trachea midline, no adenopathy, thyroid: no enlargment/tenderness/nodules, no carotid bruit and no JVD. Lungs:   Clear to auscultation bilaterally. Chest wall:  No tenderness or deformity. Heart:  Regular rate and rhythm, S1, S2 normal, no murmur, click, rub or gallop. Abdomen:   Soft, non-tender. Bowel sounds normal   Extremities: Extremities normal, atraumatic, no cyanosis or edema. Pulses: 2+ and symmetric all extremities. Skin: Skin color, texture, turgor normal. No rashes or lesions   Lymph nodes: Cervical, supraclavicular, and axillary nodes normal.   Neurologic: Grossly nonfocal       Lab Results   Component Value Date/Time    Sodium 138 08/08/2021 05:28 AM    Potassium 3.8 08/08/2021 05:28 AM    Chloride 107 08/08/2021 05:28 AM    CO2 24 08/08/2021 05:28 AM    BUN 15 08/08/2021 05:28 AM    Creatinine 0.85 08/08/2021 05:28 AM    Glucose 142 (H) 08/08/2021 05:28 AM    Calcium 8.2 (L) 08/08/2021 05:28 AM    Magnesium 2.2 08/08/2021 05:28 AM    Lactic acid 1.8 08/07/2021 05:13 PM       Lab Results   Component Value Date/Time    WBC 14.2 (H) 08/08/2021 05:28 AM    HGB 13.8 08/08/2021 05:28 AM    PLATELET 800 48/58/0630 05:28 AM    MCV 90.0 08/08/2021 05:28 AM       Lab Results   Component Value Date/Time    Alk.  phosphatase 63 08/08/2021 05:28 AM    Protein, total 7.5 08/08/2021 05:28 AM    Albumin 3.1 (L) 08/08/2021 05:28 AM    Globulin 4.4 (H) 08/08/2021 05:28 AM       No results found for: IRON, FE, TIBC, IBCT, PSAT, FERR    Lab Results   Component Value Date/Time    C-Reactive protein 23.90 (H) 08/08/2021 05:28 AM        No results found for: PH, PHI, PCO2, PCO2I, PO2, PO2I, HCO3, HCO3I, FIO2, FIO2I    Lab Results   Component Value Date/Time    Troponin-I, Qt. <0.05 08/07/2021 12:51 PM        Lab Results   Component Value Date/Time    Culture result: NO GROWTH AFTER 2 HOURS 08/07/2021 01:15 PM    Culture result: NO GROWTH AFTER 16 HOURS 08/07/2021 12:51 PM       No results found for: TOXA1, RPR, HBCM, HBSAG, HAAB, HCAB1, HAAT, G6PD, CRYAC, HIVGT, HIVR, HIV1, HIV12, HIVPC, HIVRPI    No results found for: VANCT, CPK    Lab Results   Component Value Date/Time    Color YELLOW/STRAW 08/07/2021 01:35 PM    Appearance CLEAR 08/07/2021 01:35 PM    Specific gravity 1.025 08/07/2021 01:35 PM    pH (UA) 6.5 08/07/2021 01:35 PM    Protein 100 (A) 08/07/2021 01:35 PM    Glucose 250 (A) 08/07/2021 01:35 PM    Ketone TRACE (A) 08/07/2021 01:35 PM    Bilirubin Negative 08/07/2021 01:35 PM    Blood Negative 08/07/2021 01:35 PM    Urobilinogen 4.0 (H) 08/07/2021 01:35 PM    Nitrites Negative 08/07/2021 01:35 PM    Leukocyte Esterase Negative 08/07/2021 01:35 PM    WBC 20-50 08/07/2021 01:35 PM    RBC 5-10 08/07/2021 01:35 PM    Bacteria 1+ (A) 08/07/2021 01:35 PM       IMPRESSION  · Acute Respiratory Failure with Hypoxia; resolved on RA  · Covid-19 PNA  · Hyperlipidemia    PLAN  · Supplemental oxygen to keep sats >90%, taken off of oxygen this morning  · Remdesivir  · Dexamethasone  · Stop ABX; do not suspect bacterial superinfection  · Monitor inflammatory markers, d-dimer  · F/U cultures, PNA panel  · Lovenox q12h  · Encourage IS use, proning as able, OOB  · Will need repeat imaging as an outpatient  · Will need walking oximetry prior to discharge    Thank you very much for the consult. Will continue to follow.       Cira Kothari NP

## 2021-08-08 NOTE — PROGRESS NOTES
8/8/2021  9:32 AM    Reason for Admission:  Hypoxia; COVID+  Assessment completed over the phone with patient    PMH: HLD    At baseline, pt states he is iADLs and drives. He lives at home with his wife and children in a home with 4 steps to enter, one-flight to second floor bedroom. Pt does not own or utilize any DME and has never received home health services. Patient states his wife is also likely to have COVID but is currently experiencing only mild symptoms. He anticipates she will be able to transport him on discharge. Preferred Rx: [address]                     RUR Score:      11%, low               Plan for utilizing home health:      Not anticipated    PCP: First and Last name:  Stella Vance MD     Name of Practice:    Are you a current patient: Yes/No: Yes   Approximate date of last visit: 12/2020   Can you participate in a virtual visit with your PCP: Yes                    Current Advanced Directive/Advance Care Plan: Full Code      Healthcare Decision Maker:                Primary Decision Maker: Beth LOZANO [phone]                  Transition of Care Plan:                    1. IV antbx; pulmonology consult; O2 as needed - using 2L NC at night; room air during day  2. Home with family assistance anticipated  3. Outpatient follow-up  4. Wife to transport on discharge  5. CM to continue to follow    Care Management Interventions  PCP Verified by CM: Yes Bart Dorantes)  Palliative Care Criteria Met (RRAT>21 & CHF Dx)?: No  Mode of Transport at Discharge:  Other (see comment) (spouse)  Transition of Care Consult (CM Consult): Discharge Planning  MyChart Signup: No  Discharge Durable Medical Equipment: No  Physical Therapy Consult: No  Occupational Therapy Consult: No  Speech Therapy Consult: No  Current Support Network: Lives with Spouse  Confirm Follow Up Transport: Family  The Plan for Transition of Care is Related to the Following Treatment Goals : hypoxia; COVID+  Discharge Location  Discharge Placement: Home with family assistance    Dale Breen RN

## 2021-08-08 NOTE — PROGRESS NOTES
0730 Bedside and Verbal shift change report given to Татьяна Miller RN (oncoming nurse) by Ynes Rhodes RN (offgoing nurse). Report included the following information SBAR and Kardex. This patient was assisted with Intentional Toileting every 2 hours during this shift as appropriate. Documentation of ambulation and output reflected on Flowsheet as appropriate. Purposeful hourly rounding was completed using AIDET and 5Ps. Outcomes of PHR documented as they occurred. Bed alarm in use as appropriate. Dual Suction and ambubag in place. 1930 Bedside and Verbal shift change report given to Loren Neely RN (oncoming nurse) by Татьяна Ochoa RN (offgoing nurse). Report included the following information SBAR and Kardex.

## 2021-08-09 VITALS
SYSTOLIC BLOOD PRESSURE: 140 MMHG | DIASTOLIC BLOOD PRESSURE: 88 MMHG | HEIGHT: 75 IN | BODY MASS INDEX: 27.96 KG/M2 | RESPIRATION RATE: 18 BRPM | HEART RATE: 80 BPM | OXYGEN SATURATION: 91 % | TEMPERATURE: 98.6 F | WEIGHT: 224.9 LBS

## 2021-08-09 LAB
ALBUMIN SERPL-MCNC: 2.8 G/DL (ref 3.5–5)
ALBUMIN/GLOB SERPL: 0.7 {RATIO} (ref 1.1–2.2)
ALP SERPL-CCNC: 60 U/L (ref 45–117)
ALT SERPL-CCNC: 29 U/L (ref 12–78)
ANION GAP SERPL CALC-SCNC: 4 MMOL/L (ref 5–15)
AST SERPL-CCNC: 26 U/L (ref 15–37)
ATRIAL RATE: 113 BPM
BASOPHILS # BLD: 0 K/UL (ref 0–0.1)
BASOPHILS NFR BLD: 0 % (ref 0–1)
BILIRUB SERPL-MCNC: 0.4 MG/DL (ref 0.2–1)
BUN SERPL-MCNC: 24 MG/DL (ref 6–20)
BUN/CREAT SERPL: 27 (ref 12–20)
CALCIUM SERPL-MCNC: 8.3 MG/DL (ref 8.5–10.1)
CALCULATED P AXIS, ECG09: 20 DEGREES
CALCULATED T AXIS, ECG11: 31 DEGREES
CHLORIDE SERPL-SCNC: 106 MMOL/L (ref 97–108)
CO2 SERPL-SCNC: 26 MMOL/L (ref 21–32)
CREAT SERPL-MCNC: 0.9 MG/DL (ref 0.7–1.3)
CRP SERPL HS-MCNC: >9.5 MG/L
CRP SERPL-MCNC: 14.2 MG/DL (ref 0–0.6)
D DIMER PPP FEU-MCNC: 0.93 MG/L FEU (ref 0–0.65)
DIAGNOSIS, 93000: NORMAL
DIFFERENTIAL METHOD BLD: ABNORMAL
EOSINOPHIL # BLD: 0 K/UL (ref 0–0.4)
EOSINOPHIL NFR BLD: 0 % (ref 0–7)
ERYTHROCYTE [DISTWIDTH] IN BLOOD BY AUTOMATED COUNT: 11.5 % (ref 11.5–14.5)
FERRITIN SERPL-MCNC: 1115 NG/ML (ref 26–388)
FIBRINOGEN PPP-MCNC: 704 MG/DL (ref 200–475)
GLOBULIN SER CALC-MCNC: 4.2 G/DL (ref 2–4)
GLUCOSE BLD STRIP.AUTO-MCNC: 208 MG/DL (ref 65–117)
GLUCOSE BLD STRIP.AUTO-MCNC: 214 MG/DL (ref 65–117)
GLUCOSE BLD STRIP.AUTO-MCNC: 238 MG/DL (ref 65–117)
GLUCOSE SERPL-MCNC: 171 MG/DL (ref 65–100)
HCT VFR BLD AUTO: 39.9 % (ref 36.6–50.3)
HGB BLD-MCNC: 13.9 G/DL (ref 12.1–17)
IMM GRANULOCYTES # BLD AUTO: 0.1 K/UL (ref 0–0.04)
IMM GRANULOCYTES NFR BLD AUTO: 1 % (ref 0–0.5)
LYMPHOCYTES # BLD: 1 K/UL (ref 0.8–3.5)
LYMPHOCYTES NFR BLD: 8 % (ref 12–49)
MAGNESIUM SERPL-MCNC: 2.4 MG/DL (ref 1.6–2.4)
MCH RBC QN AUTO: 31.7 PG (ref 26–34)
MCHC RBC AUTO-ENTMCNC: 34.8 G/DL (ref 30–36.5)
MCV RBC AUTO: 90.9 FL (ref 80–99)
MONOCYTES # BLD: 0.6 K/UL (ref 0–1)
MONOCYTES NFR BLD: 4 % (ref 5–13)
NEUTS SEG # BLD: 11.4 K/UL (ref 1.8–8)
NEUTS SEG NFR BLD: 87 % (ref 32–75)
NRBC # BLD: 0 K/UL (ref 0–0.01)
NRBC BLD-RTO: 0 PER 100 WBC
P-R INTERVAL, ECG05: 136 MS
PLATELET # BLD AUTO: 231 K/UL (ref 150–400)
PMV BLD AUTO: 9.1 FL (ref 8.9–12.9)
POTASSIUM SERPL-SCNC: 3.9 MMOL/L (ref 3.5–5.1)
PROT SERPL-MCNC: 7 G/DL (ref 6.4–8.2)
Q-T INTERVAL, ECG07: 298 MS
QRS DURATION, ECG06: 82 MS
QTC CALCULATION (BEZET), ECG08: 408 MS
RBC # BLD AUTO: 4.39 M/UL (ref 4.1–5.7)
SERVICE CMNT-IMP: ABNORMAL
SODIUM SERPL-SCNC: 136 MMOL/L (ref 136–145)
VENTRICULAR RATE, ECG03: 113 BPM
WBC # BLD AUTO: 13 K/UL (ref 4.1–11.1)

## 2021-08-09 PROCEDURE — 82728 ASSAY OF FERRITIN: CPT

## 2021-08-09 PROCEDURE — 85025 COMPLETE CBC W/AUTO DIFF WBC: CPT

## 2021-08-09 PROCEDURE — 74011000250 HC RX REV CODE- 250: Performed by: INTERNAL MEDICINE

## 2021-08-09 PROCEDURE — 83735 ASSAY OF MAGNESIUM: CPT

## 2021-08-09 PROCEDURE — 94664 DEMO&/EVAL PT USE INHALER: CPT

## 2021-08-09 PROCEDURE — 85384 FIBRINOGEN ACTIVITY: CPT

## 2021-08-09 PROCEDURE — 94640 AIRWAY INHALATION TREATMENT: CPT

## 2021-08-09 PROCEDURE — 94618 PULMONARY STRESS TESTING: CPT

## 2021-08-09 PROCEDURE — 85379 FIBRIN DEGRADATION QUANT: CPT

## 2021-08-09 PROCEDURE — 74011000258 HC RX REV CODE- 258: Performed by: INTERNAL MEDICINE

## 2021-08-09 PROCEDURE — 74011636637 HC RX REV CODE- 636/637: Performed by: INTERNAL MEDICINE

## 2021-08-09 PROCEDURE — 82962 GLUCOSE BLOOD TEST: CPT

## 2021-08-09 PROCEDURE — 86141 C-REACTIVE PROTEIN HS: CPT

## 2021-08-09 PROCEDURE — 74011250637 HC RX REV CODE- 250/637: Performed by: INTERNAL MEDICINE

## 2021-08-09 PROCEDURE — 80053 COMPREHEN METABOLIC PANEL: CPT

## 2021-08-09 PROCEDURE — 86140 C-REACTIVE PROTEIN: CPT

## 2021-08-09 PROCEDURE — 36415 COLL VENOUS BLD VENIPUNCTURE: CPT

## 2021-08-09 PROCEDURE — 74011250636 HC RX REV CODE- 250/636: Performed by: INTERNAL MEDICINE

## 2021-08-09 RX ORDER — ZINC GLUCONATE 50 MG
50 TABLET ORAL DAILY
Qty: 30 TABLET | Refills: 0 | Status: SHIPPED | OUTPATIENT
Start: 2021-08-10

## 2021-08-09 RX ORDER — DEXAMETHASONE 6 MG/1
TABLET ORAL
Qty: 10 TABLET | Refills: 0 | Status: SHIPPED | OUTPATIENT
Start: 2021-08-09

## 2021-08-09 RX ORDER — ASCORBIC ACID 500 MG
500 TABLET ORAL 2 TIMES DAILY
Qty: 60 TABLET | Refills: 0 | Status: SHIPPED | OUTPATIENT
Start: 2021-08-10

## 2021-08-09 RX ADMIN — DEXAMETHASONE SODIUM PHOSPHATE 6 MG: 10 INJECTION, SOLUTION INTRAMUSCULAR; INTRAVENOUS at 09:10

## 2021-08-09 RX ADMIN — IPRATROPIUM BROMIDE AND ALBUTEROL 1 PUFF: 20; 100 SPRAY, METERED RESPIRATORY (INHALATION) at 03:28

## 2021-08-09 RX ADMIN — Medication 10 ML: at 16:24

## 2021-08-09 RX ADMIN — IPRATROPIUM BROMIDE AND ALBUTEROL 1 PUFF: 20; 100 SPRAY, METERED RESPIRATORY (INHALATION) at 13:15

## 2021-08-09 RX ADMIN — INSULIN LISPRO 3 UNITS: 100 INJECTION, SOLUTION INTRAVENOUS; SUBCUTANEOUS at 11:54

## 2021-08-09 RX ADMIN — ENOXAPARIN SODIUM 40 MG: 30 INJECTION SUBCUTANEOUS at 16:23

## 2021-08-09 RX ADMIN — Medication 10 ML: at 03:29

## 2021-08-09 RX ADMIN — Medication 2000 UNITS: at 09:11

## 2021-08-09 RX ADMIN — OXYCODONE HYDROCHLORIDE AND ACETAMINOPHEN 500 MG: 500 TABLET ORAL at 16:23

## 2021-08-09 RX ADMIN — Medication 50 MG: at 09:11

## 2021-08-09 RX ADMIN — REMDESIVIR 100 MG: 100 INJECTION, POWDER, LYOPHILIZED, FOR SOLUTION INTRAVENOUS at 16:23

## 2021-08-09 RX ADMIN — INSULIN LISPRO 3 UNITS: 100 INJECTION, SOLUTION INTRAVENOUS; SUBCUTANEOUS at 16:24

## 2021-08-09 RX ADMIN — GUAIFENESIN 1200 MG: 600 TABLET ORAL at 09:11

## 2021-08-09 RX ADMIN — ENOXAPARIN SODIUM 40 MG: 30 INJECTION SUBCUTANEOUS at 03:28

## 2021-08-09 RX ADMIN — IPRATROPIUM BROMIDE AND ALBUTEROL 1 PUFF: 20; 100 SPRAY, METERED RESPIRATORY (INHALATION) at 07:32

## 2021-08-09 RX ADMIN — INSULIN LISPRO 3 UNITS: 100 INJECTION, SOLUTION INTRAVENOUS; SUBCUTANEOUS at 09:11

## 2021-08-09 RX ADMIN — OXYCODONE HYDROCHLORIDE AND ACETAMINOPHEN 500 MG: 500 TABLET ORAL at 09:11

## 2021-08-09 NOTE — PROGRESS NOTES
t    Name: [facility]: [address]   : 1970 Admit Date: 2021   Phone: [phone]  Room: 327/01   PCP: Marina Palacio MD  MRN: [id number]   Date: 2021  Code: Full Code        HPI:      Chart and notes reviewed. Data reviewed. I review the patient's current medications in the medical record at each encounter.  I have evaluated and examined the patient. 9:18 AM       History was obtained from patient. I was asked by Yodit Rainey DO to see Bassam Hebert in consultation for a chief complaint of Covid-19 pneumonia. History of Present Illness:   is a very pleasant, 54 yo gentleman that presented to [facility] yesterday after starting to feel poorly on Wednesday. His son was diagnosed with Covid-19 last week. He received the J&J vaccine in May. Reports that he had several days of shortness of breath, fevers, and cough. Denies past history of lung disease. He is a never smoker. Other PMH is hyperlipidemia. He reports that he is feeling better this morning. Sitting up in chair. Images:  Personally reviewed. CXR : Bibasilar opacities, minimal infiltrate in lateral aspect of left midlung    CTA : No PE. Multifocal peripheral groundglass opacities. Interval Hx:  Tm 99.2 overnight. Is feeling much better. Off of oxygen since yesterday. Still w some cough. Past Medical History:   Diagnosis Date    High cholesterol        Past Surgical History:   Procedure Laterality Date    HX ORTHOPAEDIC Right 2017    meniscus repair    HX ORTHOPAEDIC Left 2017    shoulder surgery    HX ORTHOPAEDIC Right 2004    shoulder surgery    HX TONSILLECTOMY      HX WISDOM TEETH EXTRACTION         No family history on file.     Social History     Tobacco Use    Smoking status: Never Smoker    Smokeless tobacco: Never Used   Substance Use Topics    Alcohol use: Yes     Comment: social       No Known Allergies    Current Facility-Administered Medications Medication Dose Route Frequency    sodium chloride (NS) flush 5-10 mL  5-10 mL IntraVENous PRN    sodium chloride (NS) flush 5-40 mL  5-40 mL IntraVENous Q8H    sodium chloride (NS) flush 5-40 mL  5-40 mL IntraVENous PRN    acetaminophen (TYLENOL) tablet 650 mg  650 mg Oral Q6H PRN    Or    acetaminophen (TYLENOL) suppository 650 mg  650 mg Rectal Q6H PRN    polyethylene glycol (MIRALAX) packet 17 g  17 g Oral DAILY PRN    ondansetron (ZOFRAN ODT) tablet 4 mg  4 mg Oral Q8H PRN    Or    ondansetron (ZOFRAN) injection 4 mg  4 mg IntraVENous Q6H PRN    cholecalciferol (VITAMIN D3) (1000 Units /25 mcg) tablet 2,000 Units  2,000 Units Oral DAILY    ascorbic acid (vitamin C) (VITAMIN C) tablet 500 mg  500 mg Oral BID    zinc gluconate tablet 50 mg  50 mg Oral DAILY    guaiFENesin ER (MUCINEX) tablet 1,200 mg  1,200 mg Oral Q12H    insulin lispro (HUMALOG) injection   SubCUTAneous AC&HS    glucose chewable tablet 16 g  4 Tablet Oral PRN    dextrose (D50W) injection syrg 12.5-25 g  12.5-25 g IntraVENous PRN    glucagon (GLUCAGEN) injection 1 mg  1 mg IntraMUSCular PRN    enoxaparin (LOVENOX) injection 40 mg  40 mg SubCUTAneous Q12H    remdesivir 100 mg in 0.9% sodium chloride 250 mL IVPB  100 mg IntraVENous Q24H    ipratropium-albuterol (COMBIVENT RESPIMAT) 20 mcg-100 mcg inhalation spray  1 Puff Inhalation Q6H RT    dexamethasone (PF) (DECADRON) 10 mg/mL injection 6 mg  6 mg IntraVENous DAILY         REVIEW OF SYSTEMS   Negative except as stated in the HPI. Physical Exam:   Visit Vitals  /85 (BP 1 Location: Right upper arm, BP Patient Position: Sitting)   Pulse 86   Temp 98.3 °F (36.8 °C)   Resp 18   Ht 6' 3\" (1.905 m)   Wt 104.3 kg (230 lb)   SpO2 90%   BMI 28.75 kg/m²       General:  Alert, cooperative, no distress, appears stated age. Head:  Normocephalic, without obvious abnormality, atraumatic. Eyes:  Conjunctivae/corneas clear. Nose: Nares normal. Septum midline.  Mucosa normal.    Throat: Lips, mucosa, and tongue normal. Teeth and gums normal.   Neck: Supple, symmetrical, trachea midline, no adenopathy, thyroid: no enlargment/tenderness/nodules, no carotid bruit and no JVD. Lungs:   Clear to auscultation bilaterally, decreased bs   Chest wall:  No tenderness or deformity. Heart:  Regular rate and rhythm, S1, S2 normal, no murmur, click, rub or gallop. Abdomen:   Soft, non-tender. Bowel sounds normal   Extremities: Extremities normal, atraumatic, no cyanosis or edema. Pulses: 2+ and symmetric all extremities. Skin: Skin color, texture, turgor normal. No rashes or lesions   Lymph nodes: Cervical, supraclavicular, and axillary nodes normal.   Neurologic: Grossly nonfocal       Lab Results   Component Value Date/Time    Sodium 136 08/09/2021 12:13 AM    Potassium 3.9 08/09/2021 12:13 AM    Chloride 106 08/09/2021 12:13 AM    CO2 26 08/09/2021 12:13 AM    BUN 24 (H) 08/09/2021 12:13 AM    Creatinine 0.90 08/09/2021 12:13 AM    Glucose 171 (H) 08/09/2021 12:13 AM    Calcium 8.3 (L) 08/09/2021 12:13 AM    Magnesium 2.4 08/09/2021 12:13 AM    Lactic acid 1.8 08/07/2021 05:13 PM       Lab Results   Component Value Date/Time    WBC 13.0 (H) 08/09/2021 12:13 AM    HGB 13.9 08/09/2021 12:13 AM    PLATELET 848 22/95/6166 12:13 AM    MCV 90.9 08/09/2021 12:13 AM       Lab Results   Component Value Date/Time    Alk.  phosphatase 60 08/09/2021 12:13 AM    Protein, total 7.0 08/09/2021 12:13 AM    Albumin 2.8 (L) 08/09/2021 12:13 AM    Globulin 4.2 (H) 08/09/2021 12:13 AM       Lab Results   Component Value Date/Time    Ferritin 1,115 (H) 08/09/2021 12:13 AM       Lab Results   Component Value Date/Time    C-Reactive protein 14.20 (H) 08/09/2021 12:13 AM        No results found for: PH, PHI, PCO2, PCO2I, PO2, PO2I, HCO3, HCO3I, FIO2, FIO2I    Lab Results   Component Value Date/Time    Troponin-I, Qt. <0.05 08/07/2021 12:51 PM        Lab Results   Component Value Date/Time    Culture result: No growth (<1,000 CFU/ML) 08/07/2021 01:35 PM    Culture result: NO GROWTH 1 DAY 08/07/2021 01:15 PM    Culture result: NO GROWTH 2 DAYS 08/07/2021 12:51 PM       No results found for: TOXA1, RPR, HBCM, HBSAG, HAAB, HCAB1, HAAT, G6PD, CRYAC, HIVGT, HIVR, HIV1, HIV12, HIVPC, HIVRPI    No results found for: VANCT, CPK    Lab Results   Component Value Date/Time    Color YELLOW/STRAW 08/07/2021 01:35 PM    Appearance CLEAR 08/07/2021 01:35 PM    Specific gravity 1.025 08/07/2021 01:35 PM    pH (UA) 6.5 08/07/2021 01:35 PM    Protein 100 (A) 08/07/2021 01:35 PM    Glucose 250 (A) 08/07/2021 01:35 PM    Ketone TRACE (A) 08/07/2021 01:35 PM    Bilirubin Negative 08/07/2021 01:35 PM    Blood Negative 08/07/2021 01:35 PM    Urobilinogen 4.0 (H) 08/07/2021 01:35 PM    Nitrites Negative 08/07/2021 01:35 PM    Leukocyte Esterase Negative 08/07/2021 01:35 PM    WBC 20-50 08/07/2021 01:35 PM    RBC 5-10 08/07/2021 01:35 PM    Bacteria 1+ (A) 08/07/2021 01:35 PM       IMPRESSION  · Acute Respiratory Failure with Hypoxia; resolved on RA  · Covid-19 PNA  · Hyperlipidemia    PLAN  · Tolerating RA  · OK for d/c from my standpoint. Would d/c on dexamethasone to complete 10 d. OK to stop remdesivir at d/c  · Pt can follow up w his PCP, Dr. Nelia Levin, or can see us to assure resolution. Would repeat cxr in several wks to assure resolution as long as other symptoms resolve.           Ryan Aranda MD

## 2021-08-09 NOTE — PROGRESS NOTES
0730 Bedside and Verbal shift change report given to Татьяна Shepherd RN (oncoming nurse) by Deidre Velez RN (offgoing nurse). Report included the following information SBAR and Kardex. This patient was assisted with Intentional Toileting every 2 hours during this shift as appropriate. Documentation of ambulation and output reflected on Flowsheet as appropriate. Purposeful hourly rounding was completed using AIDET and 5Ps. Outcomes of PHR documented as they occurred. Bed alarm in use as appropriate. Dual Suction and ambubag in place. 2025 [facility] to Dr Bita Alexis to notify: patient had 6 min walk and does not require home oxygen, blood sugar has been consistantly above 200 and wan not on any previous medications at home for blood sugar. 1836 Patient reviewed discharge instructions, verbalized understanding, no questions. Esigned discharge.

## 2021-08-09 NOTE — DISCHARGE INSTRUCTIONS
Patient Discharge Instructions    Xander Mayo / [id number] : 1970    Admitted 2021 Discharged: 2021 4:50 PM     ACUTE DIAGNOSES:  COVID-19 [U07.1]    CHRONIC MEDICAL DIAGNOSES:  Problem List as of 2021 Date Reviewed: 2019        Codes Class Noted - Resolved    Acute respiratory failure with hypoxia [facility]) ICD-10-CM: J96.01  ICD-9-CM: 518.81  2021 - Present        Pneumonia due to COVID-19 virus ICD-10-CM: U07.1, J12.82  ICD-9-CM: 480.8, 079.89  2021 - Present        Sepsis with acute organ dysfunction ([facility] [address] ICD-10-CM: A41.9, R65.20  ICD-9-CM: 038.9, 995.92  2021 - Present        Elevated glucose ICD-10-CM: R73.09  ICD-9-CM: 790.29  2021 - Present        Elevated lactic acid level ICD-10-CM: R79.89  ICD-9-CM: 276.2  2021 - Present        Pyuria ICD-10-CM: R82.81  ICD-9-CM: 791.9  2021 - Present        HLD (hyperlipidemia) ICD-10-CM: E78.5  ICD-9-CM: 272.4  2021 - Present        Plantar fasciitis of left foot ICD-10-CM: M72.2  ICD-9-CM: 728.71  2019 - Present        RESOLVED: COVID-19 ICD-10-CM: U07.1  ICD-9-CM: 079.89  2021 - 2021              DISCHARGE MEDICATIONS:         · It is important that you take the medication exactly as they are prescribed. · Keep your medication in the bottles provided by the pharmacist and keep a list of the medication names, dosages, and times to be taken in your wallet. · Do not take other medications without consulting your doctor. DIET:  Diabetic Diet  ACTIVITY: Activity as tolerated    ADDITIONAL INFORMATION: If you experience any of the following symptoms then please call your primary care physician or return to the emergency room if you cannot get hold of your doctor: Fever, chills, nausea, vomiting, diarrhea, change in mentation, falling, bleeding, shortness of breath. FOLLOW UP CARE:  Dr. Wilbert Cardona MD  you are to call and set up an appointment to see them with in 1 week.     Follow-up with specialists at directed by them      Information obtained by :  I understand that if any problems occur once I am at home I am to contact my physician. I understand and acknowledge receipt of the instructions indicated above.                                                                                                                                            Physician's or R.N.'s Signature                                                                  Date/Time                                                                                                                                              Patient or Representative Signature                                                          Date/Time

## 2021-08-09 NOTE — PROGRESS NOTES
Transition of Care Plan:    RUR-12%                1. IV antbx; pulmonology following  2. No supplemental O2 needed as evidenced by walking oximetry eval  3. Outpatient follow-up  4. Wife to transport on discharge  5. CM to continue to follow  SAM Gallardo

## 2021-08-09 NOTE — PROGRESS NOTES
Bedside and Verbal shift change report given to April (oncoming nurse) by Beatris Barcenas (offgoing nurse).  Report included the following information SBAR, Kardex and Recent Results.

## 2021-08-09 NOTE — DISCHARGE SUMMARY
Physician Discharge Summary     Patient ID:    Mario Herring  [id number]  74 y.o.  1970  Jaqueline Julio MD    Admit date: 8/7/2021  Discharge date and time: 8/9/2021  Admission Diagnoses: COVID-19 [U07.1]   Discharge Diagnosis: COVID Pneumonia  Condition on Discharge- improved and stable  Discharge Medications:   Current Discharge Medication List      START taking these medications    Details   ascorbic acid, vitamin C, (VITAMIN C) 500 mg tablet Take 1 Tablet by mouth two (2) times a day. Qty: 60 Tablet, Refills: 0      dexAMETHasone (DECADRON) 6 mg tablet Take 1 daily  Qty: 10 Tablet, Refills: 0      zinc gluconate 50 mg tablet Take 1 Tablet by mouth daily. Qty: 30 Tablet, Refills: 0      ipratropium-albuteroL (COMBIVENT RESPIMAT)  mcg/actuation inhaler Take 1 Puff by inhalation every four (4) hours as needed for Wheezing. Qty: 1 Inhaler, Refills: 0         CONTINUE these medications which have NOT CHANGED    Details   acetaminophen (TYLENOL) 325 mg tablet Take 650 mg by mouth every four (4) hours as needed for Fever. guaiFENesin (Mucinex) 1,200 mg Ta12 ER tablet Take 1,200 mg by mouth two (2) times a day. pseudoephedrine (Sudafed) 30 mg tablet Take 30 mg by mouth every four (4) hours as needed for Congestion. cholecalciferol (VITAMIN D3) 1,000 unit cap Take 1,000 Units by mouth daily. Follow up Care:    1. Jaqueline Julio MD with in 1 weeks  2. specialists as directed. Diet:  Diabetic Diet  Disposition:  Home.   Advanced Directive:  Discharge Exam:  [See today's progress note.]  CONSULTATIONS: Pulmonary/Intensive care    Significant Diagnostic Studies:   Recent Labs     08/09/21  0013 08/08/21  0528   WBC 13.0* 14.2*   HGB 13.9 13.8   HCT 39.9 40.3    189     Recent Labs     08/09/21  0013 08/08/21  0528 08/07/21  1251    138 134*   K 3.9 3.8 3.3*    107 101   CO2 26 24 26   BUN 24* 15 13   CREA 0.90 0.85 1.04   * 142* 215*   CA 8.3* 8.2* 8.3*   MG 2.4 2.2  --      Recent Labs     08/09/21  0013 08/08/21  0528 08/07/21  1251   ALT 29 24 28   AP 60 63 65   TBILI 0.4 0.5 0.7   TP 7.0 7.5 7.7   ALB 2.8* 3.1* 3.4*   GLOB 4.2* 4.4* 4.3*       Recent Labs     08/09/21  0013 08/08/21  0528   FERR 1,115* 870*     Lab Results   Component Value Date/Time    Glucose (POC) 238 (H) 08/09/2021 03:35 PM    Glucose (POC) 208 (H) 08/09/2021 11:00 AM    Glucose (POC) 214 (H) 08/09/2021 07:36 AM    Glucose (POC) 263 (H) 08/08/2021 08:24 PM    Glucose (POC) 249 (H) 08/08/2021 03:53 PM       HOSPITAL COURSE & TREATMENT RENDERED:   Acute respiratory failure with hypoxia ([facility] [address] (8/7/2021): Desaturates to 86% on ambulation. -CTA chest negative for PE.   -Nasal cannula as needed.    -ABG and chest x-ray as needed.    -Incentive spirometry    -Consult pulmonology.     Pneumonia due to COVID-19 virus (8/7/2021):   -Follow blood and pneumonia serologies.     -IV Decadron, ceftriaxone and azithromycin.    -Multivitamins.  -Antibiotics stopped per Pulm  -6m walk today     Sepsis with acute organ dysfunction (HCC) (8/7/2021)/ Elevated lactic acid level (8/7/2021): Meets sepsis criteria based on tachycardia, fevers, tachypnea with evidence of pneumonia.    -Elevated lactate to 2.2; trend until normal.    -IV steroids as above.     Pyuria (8/7/2021):   -Follow urine culture- no growth       Elevated glucose (8/7/2021):   -Check A1c.  Will likely be worse with steroids.    -Insulin sliding scale.     HLD (hyperlipidemia) (8/7/2021): Does not appear on home statin.           Subjective:    50 y.o.  male with a past medical history of hyperlipidemia who is admitted with Covid.  Mr. Guadalupe states that he began to to experience dyspnea, cough fevers and diarrhea on Wednesday.  His symptoms have worsened thus he presented to the emergency room today. [facility] states that he received his JJ vaccine in May 2021. [facility] states that his son recently felt unwell, and he suspects he might of had Covid but he was never tested. Edita Meadows,  Sallie Grow testing positive for Covid and is hypoxic on ambulation. (Dr Keira Abad)     :  Feeling a little better. Tmax 102 on admission. On 2L NC and sats are in the 90s. Coughing up sputum.     : Blood sugars elevated. A1c pending. T max 100.3. Antibiotics d/c per Pulm yesterday. CRP is lower this am. UC neg.      Review of Systems:   A comprehensive review of systems was negative except for that written in the HPI.     Objective:   Physical Exam:      Visit Vitals  /69   Pulse 70   Temp 98 °F (36.7 °C)   Resp 18   Ht 6' 3\" (1.905 m)   Wt 230 lb (104.3 kg)   SpO2 95%   BMI 28.75 kg/m²    O2 Flow Rate (L/min): 2 l/min O2 Device: None (Room air)     Temp (24hrs), Av.9 °F (37.2 °C), Min:98 °F (36.7 °C), Max:100.3 °F (37.9 °C)    1901 -  07  In: -   Out: 600 [Urine:600]   701 -  190  In: 1066 [P.O.:1220; I.V.:250]  Out: 1500 [Urine:1500]     General:  Alert, cooperative, no distress, appears stated age. Head:  Normocephalic, without obvious abnormality, atraumatic. Eyes:  Conjunctivae/corneas clear. PERRL, EOMs intact. Nose: Nares normal. Septum midline. Mucosa normal. No drainage or sinus tenderness. Throat: Lips, mucosa, and tongue moist..   Neck: Supple, symmetrical, trachea midline, no adenopathy, thyroid: no enlargement/tenderness/nodules, no carotid bruit and no JVD. Back:   Symmetric, no curvature. ROM normal. No CVA tenderness. Lungs:   Fairly clear   Chest wall:  No tenderness or deformity. Heart:  Regular rate and rhythm, S1, S2 normal, no murmur, click, rub or gallop. Abdomen:   Soft, non-tender. Bowel sounds normal. No masses,  No organomegaly. Extremities: no cyanosis or edema. No calf tenderness or cords. Pulses: 2+ and symmetric all extremities. Skin: Skin color, texture, turgor normal. No rashes or lesions   Neurologic: CNII-XII intact. Alert and oriented X 3.   Fine motor of hands and fingers normal.   equal.  No cogwheeling or rigidity. Gait not tested at this time. Sensation grossly normal to touch. Gross motor of extremities normal.        Data Review:   CT Chest 8/7/21    FINDINGS:   There is no pulmonary embolism. There is no aortic aneurysm or dissection.     Scattered peripheral groundglass and parenchymal opacities. Hepatic steatosis. There is no pleural or pericardial effusion. There is no mediastinal, axillary  or hilar lymphadenopathy. The aorta is normal in course and caliber. The  proximal pulmonary arteries are without evidence of filling defects. No lytic or  blastic lesions are identified. The remainder of the upper abdomen visualized is  unremarkable.     IMPRESSION  There is no pulmonary embolism. There is no aortic aneurysm or dissection. Imaging findings consistent with multifocal viral pneumonia.          Signed:  Pam Groves MD  8/9/2021  4:50 PM

## 2021-08-09 NOTE — PROGRESS NOTES
08/09/21 0951   Resting (Room Air)   SpO2 91   HR 91   During Walk (Room Air)   SpO2 90   HR 99   Rate of Dyspnea 0   Comments walked in room   After Walk   SpO2 92   HR 97   Comments returned to chair on room air   Does the Patient Qualify for Home O2 No

## 2021-08-09 NOTE — PROGRESS NOTES
Daily Progress Note: 8/9/2021  Jennifer Aguirre MD    Assessment/Plan:   Acute respiratory failure with hypoxia [facility]) (8/7/2021): Desaturates to 86% on ambulation. -CTA chest negative for PE.   -Nasal cannula as needed. -ABG and chest x-ray as needed. -Incentive spirometry    -Consult pulmonology.     Pneumonia due to COVID-19 virus (8/7/2021):   -Follow blood and pneumonia serologies. -IV Decadron, ceftriaxone and azithromycin. -Multivitamins.  -Antibiotics stopped per Pulm  -6m walk today     Sepsis with acute organ dysfunction ([facility] [address] (8/7/2021)/ Elevated lactic acid level (8/7/2021): Meets sepsis criteria based on tachycardia, fevers, tachypnea with evidence of pneumonia.    -Elevated lactate to 2.2; trend until normal.    -IV steroids as above.     Pyuria (8/7/2021):   -Follow urine culture- no growth      Elevated glucose (8/7/2021):   -Check A1c.   Will likely be worse with steroids.    -Insulin sliding scale.     HLD (hyperlipidemia) (8/7/2021): Does not appear on home statin.        Problem List:  Problem List as of 8/9/2021 Date Reviewed: 11/21/2019        Codes Class Noted - Resolved    Acute respiratory failure with hypoxia [facility]) ICD-10-CM: J96.01  ICD-9-CM: 518.81  8/7/2021 - Present        Pneumonia due to COVID-19 virus ICD-10-CM: U07.1, J12.82  ICD-9-CM: 480.8, 079.89  8/7/2021 - Present        Sepsis with acute organ dysfunction ([facility] [address] ICD-10-CM: A41.9, R65.20  ICD-9-CM: 038.9, 995.92  8/7/2021 - Present        Elevated glucose ICD-10-CM: R73.09  ICD-9-CM: 790.29  8/7/2021 - Present        Elevated lactic acid level ICD-10-CM: R79.89  ICD-9-CM: 276.2  8/7/2021 - Present        Pyuria ICD-10-CM: R82.81  ICD-9-CM: 791.9  8/7/2021 - Present        HLD (hyperlipidemia) ICD-10-CM: E78.5  ICD-9-CM: 272.4  8/7/2021 - Present        Plantar fasciitis of left foot ICD-10-CM: M72.2  ICD-9-CM: 728.71  11/21/2019 - Present        RESOLVED: COVID-19 ICD-10-CM: U07.1  ICD-9-CM: 079.89 2021 - 2021              Subjective:    53 y.o.  male with a past medical history of hyperlipidemia who is admitted with Covid. Mr. Shivani Ovalles states that he began to to experience dyspnea, cough fevers and diarrhea on Wednesday. His symptoms have worsened thus he presented to the emergency room today. He states that he received his JJ vaccine in May 2021. He states that his son recently felt unwell, and he suspects he might of had Covid but he was never tested. Today, Mr. Bell Avers testing positive for Covid and is hypoxic on ambulation. (Dr Paul Wilhelm)    :  Feeling a little better. Tmax 102 on admission. On 2L NC and sats are in the 90s. Coughing up sputum. : Blood sugars elevated. A1c pending. T max 100.3. Antibiotics d/c per Pulm yesterday. CRP is lower this am. UC neg.      Review of Systems:   A comprehensive review of systems was negative except for that written in the HPI. Objective:   Physical Exam:     Visit Vitals  /69   Pulse 70   Temp 98 °F (36.7 °C)   Resp 18   Ht 6' 3\" (1.905 m)   Wt 230 lb (104.3 kg)   SpO2 95%   BMI 28.75 kg/m²    O2 Flow Rate (L/min): 2 l/min O2 Device: None (Room air)    Temp (24hrs), Av.9 °F (37.2 °C), Min:98 °F (36.7 °C), Max:100.3 °F (37.9 °C)    1901 -  0700  In: -   Out: 600 [Urine:600]   701 - 1900  In: 0154 [P.O.:1220; I.V.:250]  Out: 1500 [Urine:1500]    General:  Alert, cooperative, no distress, appears stated age. Head:  Normocephalic, without obvious abnormality, atraumatic. Eyes:  Conjunctivae/corneas clear. PERRL, EOMs intact. Nose: Nares normal. Septum midline. Mucosa normal. No drainage or sinus tenderness. Throat: Lips, mucosa, and tongue moist..   Neck: Supple, symmetrical, trachea midline, no adenopathy, thyroid: no enlargement/tenderness/nodules, no carotid bruit and no JVD. Back:   Symmetric, no curvature. ROM normal. No CVA tenderness. Lungs:   Fairly clear   Chest wall:  No tenderness or deformity. Heart:  Regular rate and rhythm, S1, S2 normal, no murmur, click, rub or gallop. Abdomen:   Soft, non-tender. Bowel sounds normal. No masses,  No organomegaly. Extremities: no cyanosis or edema. No calf tenderness or cords. Pulses: 2+ and symmetric all extremities. Skin: Skin color, texture, turgor normal. No rashes or lesions   Neurologic: CNII-XII intact. Alert and oriented X 3. Fine motor of hands and fingers normal.   equal.  No cogwheeling or rigidity. Gait not tested at this time. Sensation grossly normal to touch. Gross motor of extremities normal.       Data Review:   CT Chest 8/7/21    FINDINGS:   There is no pulmonary embolism. There is no aortic aneurysm or dissection.     Scattered peripheral groundglass and parenchymal opacities. Hepatic steatosis. There is no pleural or pericardial effusion. There is no mediastinal, axillary  or hilar lymphadenopathy. The aorta is normal in course and caliber. The  proximal pulmonary arteries are without evidence of filling defects. No lytic or  blastic lesions are identified. The remainder of the upper abdomen visualized is  unremarkable.     IMPRESSION  There is no pulmonary embolism. There is no aortic aneurysm or dissection. Imaging findings consistent with multifocal viral pneumonia. Consider Covid. Recent Days:  Recent Labs     08/09/21  0013 08/08/21  0528 08/07/21  1251   WBC 13.0* 14.2* 11.7*   HGB 13.9 13.8 15.0   HCT 39.9 40.3 43.3    189 150     Recent Labs     08/09/21  0013 08/08/21  0528 08/07/21  1251    138 134*   K 3.9 3.8 3.3*    107 101   CO2 26 24 26   * 142* 215*   BUN 24* 15 13   CREA 0.90 0.85 1.04   CA 8.3* 8.2* 8.3*   MG 2.4 2.2  --    ALB 2.8* 3.1* 3.4*   TBILI 0.4 0.5 0.7   ALT 29 24 28     No results for input(s): PH, PCO2, PO2, HCO3, FIO2 in the last 72 hours.     24 Hour Results:  Recent Results (from the past 24 hour(s))   METABOLIC PANEL, COMPREHENSIVE    Collection Time: 08/08/21 5:28 AM   Result Value Ref Range    Sodium 138 136 - 145 mmol/L    Potassium 3.8 3.5 - 5.1 mmol/L    Chloride 107 97 - 108 mmol/L    CO2 24 21 - 32 mmol/L    Anion gap 7 5 - 15 mmol/L    Glucose 142 (H) 65 - 100 mg/dL    BUN 15 6 - 20 MG/DL    Creatinine 0.85 0.70 - 1.30 MG/DL    BUN/Creatinine ratio 18 12 - 20      GFR est AA >60 >60 ml/min/1.73m2    GFR est non-AA >60 >60 ml/min/1.73m2    Calcium 8.2 (L) 8.5 - 10.1 MG/DL    Bilirubin, total 0.5 0.2 - 1.0 MG/DL    ALT (SGPT) 24 12 - 78 U/L    AST (SGOT) 19 15 - 37 U/L    Alk. phosphatase 63 45 - 117 U/L    Protein, total 7.5 6.4 - 8.2 g/dL    Albumin 3.1 (L) 3.5 - 5.0 g/dL    Globulin 4.4 (H) 2.0 - 4.0 g/dL    A-G Ratio 0.7 (L) 1.1 - 2.2     MAGNESIUM    Collection Time: 08/08/21  5:28 AM   Result Value Ref Range    Magnesium 2.2 1.6 - 2.4 mg/dL   FERRITIN    Collection Time: 08/08/21  5:28 AM   Result Value Ref Range    Ferritin 870 (H) 26 - 388 NG/ML   D DIMER    Collection Time: 08/08/21  5:28 AM   Result Value Ref Range    D-dimer 1.10 (H) 0.00 - 0.65 mg/L FEU   C REACTIVE PROTEIN, QT    Collection Time: 08/08/21  5:28 AM   Result Value Ref Range    C-Reactive protein 23.90 (H) 0.00 - 0.60 mg/dL   FIBRINOGEN    Collection Time: 08/08/21  5:28 AM   Result Value Ref Range    Fibrinogen 783 (H) 200 - 475 mg/dL   CBC WITH AUTOMATED DIFF    Collection Time: 08/08/21  5:28 AM   Result Value Ref Range    WBC 14.2 (H) 4.1 - 11.1 K/uL    RBC 4.48 4.10 - 5.70 M/uL    HGB 13.8 12.1 - 17.0 g/dL    HCT 40.3 36.6 - 50.3 %    MCV 90.0 80.0 - 99.0 FL    MCH 30.8 26.0 - 34.0 PG    MCHC 34.2 30.0 - 36.5 g/dL    RDW 11.4 (L) 11.5 - 14.5 %    PLATELET 601 802 - 988 K/uL    MPV 9.1 8.9 - 12.9 FL    NRBC 0.0 0  WBC    ABSOLUTE NRBC 0.00 0.00 - 0.01 K/uL    NEUTROPHILS 90 (H) 32 - 75 %    LYMPHOCYTES 6 (L) 12 - 49 %    MONOCYTES 3 (L) 5 - 13 %    EOSINOPHILS 0 0 - 7 %    BASOPHILS 0 0 - 1 %    IMMATURE GRANULOCYTES 1 (H) 0.0 - 0.5 %    ABS.  NEUTROPHILS 12.8 (H) 1.8 - 8.0 K/UL ABS. LYMPHOCYTES 0.9 0.8 - 3.5 K/UL    ABS. MONOCYTES 0.4 0.0 - 1.0 K/UL    ABS. EOSINOPHILS 0.0 0.0 - 0.4 K/UL    ABS. BASOPHILS 0.0 0.0 - 0.1 K/UL    ABS. IMM. GRANS. 0.1 (H) 0.00 - 0.04 K/UL    DF AUTOMATED     GLUCOSE, POC    Collection Time: 08/08/21  8:07 AM   Result Value Ref Range    Glucose (POC) 150 (H) 65 - 117 mg/dL    Performed by [facility] ([facility])    GLUCOSE, POC    Collection Time: 08/08/21 12:32 PM   Result Value Ref Range    Glucose (POC) 145 (H) 65 - 117 mg/dL    [facility] by [facility]    GLUCOSE, POC    Collection Time: 08/08/21  3:53 PM   Result Value Ref Range    Glucose (POC) 249 (H) 65 - 117 mg/dL    [facility] by [facility] ([facility]    GLUCOSE, POC    Collection Time: 08/08/21  8:24 PM   Result Value Ref Range    Glucose (POC) 263 (H) 65 - 117 mg/dL    Performed by [facility]    METABOLIC PANEL, COMPREHENSIVE    Collection Time: 08/09/21 12:13 AM   Result Value Ref Range    Sodium 136 136 - 145 mmol/L    Potassium 3.9 3.5 - 5.1 mmol/L    Chloride 106 97 - 108 mmol/L    CO2 26 21 - 32 mmol/L    Anion gap 4 (L) 5 - 15 mmol/L    Glucose 171 (H) 65 - 100 mg/dL    BUN 24 (H) 6 - 20 MG/DL    Creatinine 0.90 0.70 - 1.30 MG/DL    BUN/Creatinine ratio 27 (H) 12 - 20      GFR est AA >60 >60 ml/min/1.73m2    GFR est non-AA >60 >60 ml/min/1.73m2    Calcium 8.3 (L) 8.5 - 10.1 MG/DL    Bilirubin, total 0.4 0.2 - 1.0 MG/DL    ALT (SGPT) 29 12 - 78 U/L    AST (SGOT) 26 15 - 37 U/L    Alk.  phosphatase 60 45 - 117 U/L    Protein, total 7.0 6.4 - 8.2 g/dL    Albumin 2.8 (L) 3.5 - 5.0 g/dL    Globulin 4.2 (H) 2.0 - 4.0 g/dL    A-G Ratio 0.7 (L) 1.1 - 2.2     MAGNESIUM    Collection Time: 08/09/21 12:13 AM   Result Value Ref Range    Magnesium 2.4 1.6 - 2.4 mg/dL   D DIMER    Collection Time: 08/09/21 12:13 AM   Result Value Ref Range    D-dimer 0.93 (H) 0.00 - 0.65 mg/L FEU   C REACTIVE PROTEIN, QT    Collection Time: 08/09/21 12:13 AM   Result Value Ref Range    C-Reactive protein 14.20 (H) 0.00 - 0.60 mg/dL   FIBRINOGEN    Collection Time: 08/09/21 12:13 AM   Result Value Ref Range    Fibrinogen 704 (H) 200 - 475 mg/dL   CBC WITH AUTOMATED DIFF    Collection Time: 08/09/21 12:13 AM   Result Value Ref Range    WBC 13.0 (H) 4.1 - 11.1 K/uL    RBC 4.39 4. 10 - 5.70 M/uL    HGB 13.9 12.1 - 17.0 g/dL    HCT 39.9 36.6 - 50.3 %    MCV 90.9 80.0 - 99.0 FL    MCH 31.7 26.0 - 34.0 PG    MCHC 34.8 30.0 - 36.5 g/dL    RDW 11.5 11.5 - 14.5 %    PLATELET 420 815 - 006 K/uL    MPV 9.1 8.9 - 12.9 FL    NRBC 0.0 0  WBC    ABSOLUTE NRBC 0.00 0.00 - 0.01 K/uL    NEUTROPHILS 87 (H) 32 - 75 %    LYMPHOCYTES 8 (L) 12 - 49 %    MONOCYTES 4 (L) 5 - 13 %    EOSINOPHILS 0 0 - 7 %    BASOPHILS 0 0 - 1 %    IMMATURE GRANULOCYTES 1 (H) 0.0 - 0.5 %    ABS. NEUTROPHILS 11.4 (H) 1.8 - 8.0 K/UL    ABS. LYMPHOCYTES 1.0 0.8 - 3.5 K/UL    ABS. MONOCYTES 0.6 0.0 - 1.0 K/UL    ABS. EOSINOPHILS 0.0 0.0 - 0.4 K/UL    ABS. BASOPHILS 0.0 0.0 - 0.1 K/UL    ABS. IMM.  GRANS. 0.1 (H) 0.00 - 0.04 K/UL    DF AUTOMATED         Medications reviewed  Current Facility-Administered Medications   Medication Dose Route Frequency    sodium chloride (NS) flush 5-10 mL  5-10 mL IntraVENous PRN    sodium chloride (NS) flush 5-40 mL  5-40 mL IntraVENous Q8H    sodium chloride (NS) flush 5-40 mL  5-40 mL IntraVENous PRN    acetaminophen (TYLENOL) tablet 650 mg  650 mg Oral Q6H PRN    Or    acetaminophen (TYLENOL) suppository 650 mg  650 mg Rectal Q6H PRN    polyethylene glycol (MIRALAX) packet 17 g  17 g Oral DAILY PRN    ondansetron (ZOFRAN ODT) tablet 4 mg  4 mg Oral Q8H PRN    Or    ondansetron (ZOFRAN) injection 4 mg  4 mg IntraVENous Q6H PRN    cholecalciferol (VITAMIN D3) (1000 Units /25 mcg) tablet 2,000 Units  2,000 Units Oral DAILY    ascorbic acid (vitamin C) (VITAMIN C) tablet 500 mg  500 mg Oral BID    zinc gluconate tablet 50 mg  50 mg Oral DAILY    guaiFENesin ER (MUCINEX) tablet 1,200 mg  1,200 mg Oral Q12H    insulin lispro (HUMALOG) injection   SubCUTAneous AC&HS    glucose chewable tablet 16 g  4 Tablet Oral PRN    dextrose (D50W) injection syrg 12.5-25 g  12.5-25 g IntraVENous PRN    glucagon (GLUCAGEN) injection 1 mg  1 mg IntraMUSCular PRN    enoxaparin (LOVENOX) injection 40 mg  40 mg SubCUTAneous Q12H    remdesivir 100 mg in 0.9% sodium chloride 250 mL IVPB  100 mg IntraVENous Q24H    ipratropium-albuterol (COMBIVENT RESPIMAT) 20 mcg-100 mcg inhalation spray  1 Puff Inhalation Q6H RT    dexamethasone (PF) (DECADRON) 10 mg/mL injection 6 mg  6 mg IntraVENous DAILY       Care Plan discussed with: Patient/Family and Nurse    Total time spent with patient: 30 minutes.     Edyta Jensen MD

## 2021-08-10 ENCOUNTER — PATIENT OUTREACH (OUTPATIENT)
Dept: CASE MANAGEMENT | Age: 51
End: 2021-08-10

## 2021-08-10 LAB
FLUID CULTURE, SPNG2: NORMAL
L PNEUMO1 AG UR QL IA: NEGATIVE
ORGANISM ID, SPNG3: NORMAL
PLEASE NOTE, SPNG4: NORMAL
S PNEUM AG SPEC QL LA: NEGATIVE
SPECIMEN SOURCE: NORMAL
SPECIMEN SOURCE: NORMAL
SPECIMEN, SPNG1: NORMAL

## 2021-08-10 NOTE — PROGRESS NOTES
Care Transitions Initial Call    Call within 2 business days of discharge: Yes     Patient: Ashia Sampson Patient : 1970 MRN: [id number]    Last Discharge [address]       Complaint Diagnosis Description Type Department Provider    21 Concern For COVID-19 (Coronavirus); Shortness of Breath Pneumonia due to COVID-19 virus . .. ED to Hosp-Admission (Discharged) (ADMIT) Rasheed Ortez MD; Julian Robertson. .. Was this an external facility discharge? No Discharge Facility: [facility]    Challenges to be reviewed by the provider   Additional needs identified to be addressed with provider: yes  Component      Latest Ref Rng & Units 2021          12:13 AM   WBC      4.1 - 11.1 K/uL 13.0 (H)     Component      Latest Ref Rng & Units 2021          12:13 AM   Ferritin      26 - 388 NG/ML 1,115 (H)        Method of communication with provider : none    Discussed COVID-19 related testing which was not done at this time. Test results were not done. Patient informed of results, if available? N/A     Advance Care Planning:   Does patient have an Advance Directive: [place] on file. Inpatient Readmission Risk score: Unplanned Readmit Risk Score: 15    Was this a readmission? no   Patient stated reason for the admission:     Patients top risk factors for readmission: medical condition-COVID/PNA   Interventions to address risk factors: Scheduled appointment with PCP-21, Obtained and reviewed discharge summary and/or continuity of care documents and Education of patient/family/caregiver/guardian to support self-management-COVID/PNA    Care Transition Nurse (CTN) contacted the patient by telephone to perform post hospital discharge assessment. Verified name and  with patient as identifiers. Provided introduction to self, and explanation of the CTN role. CTN reviewed discharge instructions, medical action plan and red flags with patient who verbalized understanding. Were discharge instructions available to patient? yes. Reviewed appropriate site of care based on symptoms and resources available to patient including: PCP and When to call 911. Patient given an opportunity to ask questions and does not have any further questions or concerns at this time. The patient agrees to contact the PCP office for questions related to their healthcare. Medication reconciliation was performed with patient, who verbalizes understanding of administration of home medications. Advised obtaining a 90-day supply of all daily and as-needed medications. Referral to Pharm D needed: no   START taking these medications     Details   ascorbic acid, vitamin C, (VITAMIN C) 500 mg tablet Take 1 Tablet by mouth two (2) times a day. Qty: 60 Tablet, Refills: 0       dexAMETHasone (DECADRON) 6 mg tablet Take 1 daily  Qty: 10 Tablet, Refills: 0       zinc gluconate 50 mg tablet Take 1 Tablet by mouth daily. Qty: 30 Tablet, Refills: 0       ipratropium-albuteroL (COMBIVENT RESPIMAT)  mcg/actuation inhaler Take 1 Puff by inhalation every four (4) hours as needed for Wheezing. Qty: 1 Inhaler, Refills     Home Health/Outpatient orders at discharge: none    Durable Medical Equipment ordered at discharge: None    Covid Risk Education    Patient decline education  COVID-19 and CDC guidelines. CTN reviewed discharge instructions, medical action plan and red flag symptoms with the patient who verbalized understanding. Discussed COVID vaccination status: YES, vaccinated. Patient was given an opportunity to verbalize any questions and concerns and agrees to contact  health care provider for questions related to their healthcare. Was patient discharged with a pulse oximeter? no.    Discussed follow-up appointments. If no appointment was previously scheduled, appointment scheduling offered: yes.  Is follow up appointment scheduled within 7 days of discharge? no.   [address] follow up appointment(s): No future appointments. Plan for follow-up call in 10-14 days based on severity of symptoms and risk factors. Plan for next call: follow up appointment-pcp  CTN provided contact information for future needs. Goals      Establish PCP relationships and regularly scheduled appointments. 8/10 Patient will attend  follow-up with PCP,  keep a list of medications to bring to f/u appointments. Pt.appt.with PCP, Elba Clifford MD  8/17/21. CTN will follow-up pt.attendance or assistance with re-scheduling at next outreach in 7-14 days. -                     Understands red flags post discharge. 8/10 Pt.will report to physician or return to ED for worsening sx, fatigue, increased fever, congestion, increase SOB or difficulty breathing. Pt.will continue with self isolation as directed 10-14 days, if sx not resolved pt.will discuss continue isolation with PCP for further instructions. CTN will follow up with pt.in 7-14 days. -[address]

## 2021-08-13 LAB
BACTERIA SPEC CULT: NORMAL
SERVICE CMNT-IMP: NORMAL

## 2021-08-14 LAB
BACTERIA SPEC CULT: NORMAL
SERVICE CMNT-IMP: NORMAL

## 2021-08-24 ENCOUNTER — PATIENT OUTREACH (OUTPATIENT)
Dept: CASE MANAGEMENT | Age: 51
End: 2021-08-24

## 2021-08-26 NOTE — PROGRESS NOTES
Goals      Establish PCP relationships and regularly scheduled appointments. 8/10 Patient will attend  follow-up with PCP,  keep a list of medications to bring to f/u appointments. Pt.appt.with PCP, Paulino De Luna MD  8/17/21. CTN will follow-up pt.attendance or assistance with re-scheduling at next outreach in 7-14 days. -[address]    8/24 Pt.reports attend appt. as scheduled. -                 Understands red flags post discharge. 8/10 Pt.will report to physician or return to ED for worsening sx, fatigue, increased fever, congestion, increase SOB or difficulty breathing. Pt.will continue with self isolation as directed 10-14 days, if sx not resolved pt.will discuss continue isolation with PCP for further instructions. CTN will follow up with pt.in 7-14 days. -[address]      8/24 Pt.reports symptoms improved, still getting over PNA sx, but doing much better. -[address]

## (undated) DEVICE — STRAP,POSITIONING,KNEE/BODY,FOAM,4X60": Brand: MEDLINE

## (undated) DEVICE — DRESSING,GAUZE,XEROFORM,CURAD,5"X9",ST: Brand: CURAD

## (undated) DEVICE — 1010 S-DRAPE TOWEL DRAPE 10/BX: Brand: STERI-DRAPE™

## (undated) DEVICE — SYRINGE,EAR/ULCER, 2 OZ, STERILE: Brand: MEDLINE

## (undated) DEVICE — Device

## (undated) DEVICE — COVER LT HNDL PLAS RIG 1 PER PK

## (undated) DEVICE — DRAPE,REIN 53X77,STERILE: Brand: MEDLINE

## (undated) DEVICE — SYR 3ML LL TIP 1/10ML GRAD --

## (undated) DEVICE — SPONGE GZ W4XL4IN COT 12 PLY TYP VII WVN C FLD DSGN

## (undated) DEVICE — NEEDLE HYPO 25GA L1.5IN BVL ORIENTED ECLIPSE

## (undated) DEVICE — DRAPE,EXTREMITY,89X128,STERILE: Brand: MEDLINE

## (undated) DEVICE — SUTURE MCRYL SZ 4-0 L27IN ABSRB UD L19MM PS-2 1/2 CIR PRIM Y426H

## (undated) DEVICE — SYR 10ML LUER LOK 1/5ML GRAD --

## (undated) DEVICE — (D)PREP SKN CHLRAPRP APPL 26ML -- CONVERT TO ITEM 371833

## (undated) DEVICE — DEVICE TRNSF SPIK STL 2008S] MICROTEK MEDICAL INC]

## (undated) DEVICE — STERILE POLYISOPRENE POWDER-FREE SURGICAL GLOVES WITH EMOLLIENT COATING: Brand: PROTEXIS

## (undated) DEVICE — STERILE POLYISOPRENE POWDER-FREE SURGICAL GLOVES: Brand: PROTEXIS

## (undated) DEVICE — ZIMMER® STERILE DISPOSABLE TOURNIQUET CUFF WITH PROTECTIVE SLEEVE AND PLC, DUAL PORT, SINGLE BLADDER, 18 IN. (46 CM)

## (undated) DEVICE — TOWEL,OR,DSP,ST,BLUE,STD,2/PK,40PK/CS: Brand: MEDLINE

## (undated) DEVICE — REM POLYHESIVE ADULT PATIENT RETURN ELECTRODE: Brand: VALLEYLAB

## (undated) DEVICE — CANISTER, RIGID, 3000CC: Brand: MEDLINE INDUSTRIES, INC.

## (undated) DEVICE — BNDG ELAS HK LOOP 4X5YD NS -- MATRIX

## (undated) DEVICE — BANDAGE COMPR 9 FTX4 IN SMOOTH COMFORTABLE SYNTH ESMRK LF

## (undated) DEVICE — SOL IRRIGATION INJ NACL 0.9% 500ML BTL

## (undated) DEVICE — INFECTION CONTROL KIT SYS

## (undated) DEVICE — BANDAGE COBAN 4 IN COMPR W4INXL5YD FOAM COHESIVE QUIK STK SELF ADH SFT